# Patient Record
Sex: FEMALE | NOT HISPANIC OR LATINO | Employment: FULL TIME | URBAN - METROPOLITAN AREA
[De-identification: names, ages, dates, MRNs, and addresses within clinical notes are randomized per-mention and may not be internally consistent; named-entity substitution may affect disease eponyms.]

---

## 2024-08-20 ENCOUNTER — APPOINTMENT (OUTPATIENT)
Dept: ULTRASOUND IMAGING | Facility: HOSPITAL | Age: 49
End: 2024-08-20
Payer: MEDICAID

## 2024-08-20 ENCOUNTER — HOSPITAL ENCOUNTER (EMERGENCY)
Facility: HOSPITAL | Age: 49
Discharge: HOME | End: 2024-08-20
Attending: EMERGENCY MEDICINE
Payer: MEDICAID

## 2024-08-20 VITALS
HEART RATE: 97 BPM | BODY MASS INDEX: 26.46 KG/M2 | TEMPERATURE: 98.3 F | WEIGHT: 155 LBS | HEIGHT: 64 IN | OXYGEN SATURATION: 100 % | SYSTOLIC BLOOD PRESSURE: 129 MMHG | RESPIRATION RATE: 18 BRPM | DIASTOLIC BLOOD PRESSURE: 65 MMHG

## 2024-08-20 DIAGNOSIS — D72.829 LEUKOCYTOSIS, UNSPECIFIED TYPE: ICD-10-CM

## 2024-08-20 DIAGNOSIS — N93.9 VAGINAL BLEEDING: Primary | ICD-10-CM

## 2024-08-20 DIAGNOSIS — D25.9 UTERINE LEIOMYOMA, UNSPECIFIED LOCATION: ICD-10-CM

## 2024-08-20 DIAGNOSIS — R82.998 LEUKOCYTES IN URINE: ICD-10-CM

## 2024-08-20 LAB
ANION GAP SERPL CALCULATED.3IONS-SCNC: 7.1 MMOL/L (ref 5–15)
BACTERIA UR QL AUTO: ABNORMAL /HPF
BASOPHILS # BLD AUTO: 0.05 10*3/MM3 (ref 0–0.2)
BASOPHILS NFR BLD AUTO: 0.3 % (ref 0–1.5)
BILIRUB UR QL STRIP: NEGATIVE
BUN SERPL-MCNC: 22 MG/DL (ref 6–20)
BUN/CREAT SERPL: 20.4 (ref 7–25)
CALCIUM SPEC-SCNC: 8.8 MG/DL (ref 8.6–10.5)
CHLORIDE SERPL-SCNC: 106 MMOL/L (ref 98–107)
CLARITY UR: ABNORMAL
CO2 SERPL-SCNC: 23.9 MMOL/L (ref 22–29)
COLOR UR: ABNORMAL
CREAT SERPL-MCNC: 1.08 MG/DL (ref 0.57–1)
D-LACTATE SERPL-SCNC: 0.7 MMOL/L (ref 0.5–2)
DEPRECATED RDW RBC AUTO: 40.6 FL (ref 37–54)
EGFRCR SERPLBLD CKD-EPI 2021: 63.5 ML/MIN/1.73
EOSINOPHIL # BLD AUTO: 0.24 10*3/MM3 (ref 0–0.4)
EOSINOPHIL NFR BLD AUTO: 1.5 % (ref 0.3–6.2)
ERYTHROCYTE [DISTWIDTH] IN BLOOD BY AUTOMATED COUNT: 11.9 % (ref 12.3–15.4)
GLUCOSE SERPL-MCNC: 110 MG/DL (ref 65–99)
GLUCOSE UR STRIP-MCNC: NEGATIVE MG/DL
HCG SERPL QL: NEGATIVE
HCT VFR BLD AUTO: 33.2 % (ref 34–46.6)
HGB BLD-MCNC: 11.1 G/DL (ref 12–15.9)
HGB UR QL STRIP.AUTO: ABNORMAL
HOLD SPECIMEN: NORMAL
HYALINE CASTS UR QL AUTO: ABNORMAL /LPF
IMM GRANULOCYTES # BLD AUTO: 0.02 10*3/MM3 (ref 0–0.05)
IMM GRANULOCYTES NFR BLD AUTO: 0.1 % (ref 0–0.5)
KETONES UR QL STRIP: ABNORMAL
LEUKOCYTE ESTERASE UR QL STRIP.AUTO: ABNORMAL
LYMPHOCYTES # BLD AUTO: 1.72 10*3/MM3 (ref 0.7–3.1)
LYMPHOCYTES NFR BLD AUTO: 10.4 % (ref 19.6–45.3)
MCH RBC QN AUTO: 31.3 PG (ref 26.6–33)
MCHC RBC AUTO-ENTMCNC: 33.4 G/DL (ref 31.5–35.7)
MCV RBC AUTO: 93.5 FL (ref 79–97)
MONOCYTES # BLD AUTO: 0.66 10*3/MM3 (ref 0.1–0.9)
MONOCYTES NFR BLD AUTO: 4 % (ref 5–12)
NEUTROPHILS NFR BLD AUTO: 13.77 10*3/MM3 (ref 1.7–7)
NEUTROPHILS NFR BLD AUTO: 83.7 % (ref 42.7–76)
NITRITE UR QL STRIP: POSITIVE
PH UR STRIP.AUTO: 6 [PH] (ref 5–8)
PLATELET # BLD AUTO: 239 10*3/MM3 (ref 140–450)
PMV BLD AUTO: 9 FL (ref 6–12)
POTASSIUM SERPL-SCNC: 3.9 MMOL/L (ref 3.5–5.2)
PROT UR QL STRIP: ABNORMAL
RBC # BLD AUTO: 3.55 10*6/MM3 (ref 3.77–5.28)
RBC # UR STRIP: ABNORMAL /HPF
REF LAB TEST METHOD: ABNORMAL
SODIUM SERPL-SCNC: 137 MMOL/L (ref 136–145)
SP GR UR STRIP: 1.02 (ref 1–1.03)
SQUAMOUS #/AREA URNS HPF: ABNORMAL /HPF
UROBILINOGEN UR QL STRIP: ABNORMAL
WBC # UR STRIP: ABNORMAL /HPF
WBC NRBC COR # BLD AUTO: 16.46 10*3/MM3 (ref 3.4–10.8)

## 2024-08-20 PROCEDURE — 87086 URINE CULTURE/COLONY COUNT: CPT | Performed by: PHYSICIAN ASSISTANT

## 2024-08-20 PROCEDURE — 87801 DETECT AGNT MULT DNA AMPLI: CPT | Performed by: PHYSICIAN ASSISTANT

## 2024-08-20 PROCEDURE — 81001 URINALYSIS AUTO W/SCOPE: CPT | Performed by: PHYSICIAN ASSISTANT

## 2024-08-20 PROCEDURE — 87798 DETECT AGENT NOS DNA AMP: CPT | Performed by: PHYSICIAN ASSISTANT

## 2024-08-20 PROCEDURE — 87661 TRICHOMONAS VAGINALIS AMPLIF: CPT | Performed by: PHYSICIAN ASSISTANT

## 2024-08-20 PROCEDURE — 76830 TRANSVAGINAL US NON-OB: CPT

## 2024-08-20 PROCEDURE — 80048 BASIC METABOLIC PNL TOTAL CA: CPT | Performed by: EMERGENCY MEDICINE

## 2024-08-20 PROCEDURE — 83605 ASSAY OF LACTIC ACID: CPT | Performed by: PHYSICIAN ASSISTANT

## 2024-08-20 PROCEDURE — 25810000003 SODIUM CHLORIDE 0.9 % SOLUTION: Performed by: PHYSICIAN ASSISTANT

## 2024-08-20 PROCEDURE — 76856 US EXAM PELVIC COMPLETE: CPT

## 2024-08-20 PROCEDURE — 87591 N.GONORRHOEAE DNA AMP PROB: CPT | Performed by: PHYSICIAN ASSISTANT

## 2024-08-20 PROCEDURE — 99284 EMERGENCY DEPT VISIT MOD MDM: CPT

## 2024-08-20 PROCEDURE — 84703 CHORIONIC GONADOTROPIN ASSAY: CPT | Performed by: PHYSICIAN ASSISTANT

## 2024-08-20 PROCEDURE — 99284 EMERGENCY DEPT VISIT MOD MDM: CPT | Performed by: PHYSICIAN ASSISTANT

## 2024-08-20 PROCEDURE — 85025 COMPLETE CBC W/AUTO DIFF WBC: CPT | Performed by: EMERGENCY MEDICINE

## 2024-08-20 PROCEDURE — 87491 CHLMYD TRACH DNA AMP PROBE: CPT | Performed by: PHYSICIAN ASSISTANT

## 2024-08-20 RX ORDER — MORPHINE SULFATE 4 MG/ML
4 INJECTION, SOLUTION INTRAMUSCULAR; INTRAVENOUS ONCE
Status: DISCONTINUED | OUTPATIENT
Start: 2024-08-20 | End: 2024-08-20 | Stop reason: HOSPADM

## 2024-08-20 RX ORDER — MEDROXYPROGESTERONE ACETATE 10 MG
10 TABLET ORAL DAILY
Qty: 14 TABLET | Refills: 0 | Status: SHIPPED | OUTPATIENT
Start: 2024-08-20 | End: 2024-08-22 | Stop reason: SDUPTHER

## 2024-08-20 RX ORDER — CEPHALEXIN 500 MG/1
500 CAPSULE ORAL 3 TIMES DAILY
Qty: 21 CAPSULE | Refills: 0 | Status: SHIPPED | OUTPATIENT
Start: 2024-08-20 | End: 2024-08-27

## 2024-08-20 RX ORDER — NAPROXEN SODIUM 220 MG
220 TABLET ORAL 2 TIMES DAILY PRN
COMMUNITY

## 2024-08-20 RX ORDER — NAPROXEN 500 MG/1
500 TABLET ORAL 2 TIMES DAILY WITH MEALS
Qty: 10 TABLET | Refills: 0 | Status: SHIPPED | OUTPATIENT
Start: 2024-08-20 | End: 2024-08-25

## 2024-08-20 RX ORDER — IBUPROFEN 600 MG/1
600 TABLET, FILM COATED ORAL ONCE
Status: DISCONTINUED | OUTPATIENT
Start: 2024-08-20 | End: 2024-08-20 | Stop reason: HOSPADM

## 2024-08-20 RX ORDER — ONDANSETRON 2 MG/ML
4 INJECTION INTRAMUSCULAR; INTRAVENOUS ONCE
Status: DISCONTINUED | OUTPATIENT
Start: 2024-08-20 | End: 2024-08-20 | Stop reason: HOSPADM

## 2024-08-20 RX ORDER — SODIUM CHLORIDE 0.9 % (FLUSH) 0.9 %
10 SYRINGE (ML) INJECTION AS NEEDED
Status: DISCONTINUED | OUTPATIENT
Start: 2024-08-20 | End: 2024-08-20 | Stop reason: HOSPADM

## 2024-08-20 RX ADMIN — SODIUM CHLORIDE 1000 ML: 9 INJECTION, SOLUTION INTRAVENOUS at 11:56

## 2024-08-20 NOTE — DISCHARGE INSTRUCTIONS
I strongly advise you call that number provided to schedule a close follow-up regarding your vaginalvaginal bleeding.  Take the medications as prescribed.  The provera should help stop the bleeding. If you develop lightheadedness, or significantly worsening bleeding, return to the ER.    The urine shows that you may have a urinary tract infection and we will give you antibiotics.    It is very important that you follow-up with your primary care next week to recheck your symptoms and to recheck your lab work specifically your elevated WBC.

## 2024-08-20 NOTE — FSED PROVIDER NOTE
"Subjective   History of Present Illness    Patient, history of Jaden-Danlos syndrome, is complaining of intermittent vaginal bleeding for about 2 months.  She noticed large blood clots with the vaginal bleeding about 4 days ago with right lower quadrant abdominal pain.  Denies any nausea, vomiting, dysuria.  In addition, patient reports that she had a Mirena IUD placed several years ago and was supposed to get it removed about 4 years ago but never followed up with OB/GYN.  Patient recently moved here to [facility] and currently does not have an OB/GYN, she called [facility] and is unable to see them until 8 months.  Denies any lightheadedness.    She reports that she has to go through about 25 pads a day and the blood clots are \"candy bars\" size.    Review of Systems   Constitutional:  Negative for activity change and appetite change.   Eyes:  Negative for pain.   Respiratory:  Negative for shortness of breath.    Gastrointestinal:  Positive for abdominal pain. Negative for nausea and vomiting.   Genitourinary:  Positive for vaginal bleeding.   Musculoskeletal:  Negative for arthralgias.   Skin:  Negative for color change.   Neurological:  Negative for dizziness.   All other systems reviewed and are negative.      Past Medical History:   Diagnosis Date    Jaden-Danlos syndrome     IUD (intrauterine device) in place     pt reports she was supposed to have mirana taken out before covid.       Allergies   Allergen Reactions    Sulfa Antibiotics Hives       History reviewed. No pertinent surgical history.    History reviewed. No pertinent family history.    Social History     Socioeconomic History    Marital status: Unknown   Tobacco Use    Smoking status: Never   Substance and Sexual Activity    Drug use: Never    Sexual activity: Yes           Objective   Physical Exam  Vitals and nursing note reviewed.   Constitutional:       Appearance: Normal appearance. She is normal weight.   HENT:      Head: " Normocephalic and atraumatic.      Nose: Nose normal.      Mouth/Throat:      Mouth: Mucous membranes are moist.   Eyes:      Pupils: Pupils are equal, round, and reactive to light.   Cardiovascular:      Rate and Rhythm: Normal rate and regular rhythm.      Pulses: Normal pulses.      Heart sounds: Normal heart sounds.   Pulmonary:      Effort: Pulmonary effort is normal.      Breath sounds: Normal breath sounds.   Genitourinary:     Cervix: Cervical bleeding present.      Comments: No cervical motion tenderness  Musculoskeletal:         General: Normal range of motion.      Cervical back: Normal range of motion.      Right lower leg: No edema.      Left lower leg: No edema.   Skin:     General: Skin is warm.   Neurological:      General: No focal deficit present.      Mental Status: She is alert.   Psychiatric:         Behavior: Behavior is cooperative.         Procedures           ED Course  ED Course as of 08/20/24 1722 Tue Aug 20, 2024   1114 WBC(!): 16.46 [SS]   1115 Hemoglobin(!): 11.1 [SS]   1205 Did a pelvic exam with KAVON Sanderson as female chaperone.  No cervical motion tenderness, mod amount of bleeding seen in the vaginal vault.  Cervical os appears closed. [SS]   1300 I discussed with Madelyn ultrasound tech regarding the results.  She does note the IUD is in the internal cervical os, 7 to 8 cm uterine fibroid. [SS]   1335 I rechecked patient informed her of the lab results including the elevated WBC.  Informed her the pending official ultrasound report then plan to consult OB/GYN.  She expressed understanding and all questions addressed at this time. [SS]   1548 Discussed with Dr. CHA, OB/GYN on-call regarding patient.  He advised to prescribe patient Provera and gave number for patient to follow-up with. [SS]      ED Course User Index  [SS] Katarzyna Eason PA-C                                           Medical Decision Making  Problems Addressed:  Leukocytes in urine: complicated acute illness or  injury  Leukocytosis, unspecified type: complicated acute illness or injury  Uterine leiomyoma, unspecified location: complicated acute illness or injury  Vaginal bleeding: complicated acute illness or injury    Amount and/or Complexity of Data Reviewed  Labs: ordered. Decision-making details documented in ED Course.  Radiology: ordered.    Risk  Prescription drug management.        Final diagnoses:   Vaginal bleeding   Uterine leiomyoma, unspecified location   Leukocytes in urine   Leukocytosis, unspecified type       ED Disposition  ED Disposition       ED Disposition   Discharge    Condition   Stable    Comment   --               [phone]  Please call this number today for a close follow up regarding your obgyn             Medication List        New Prescriptions      cephalexin 500 MG capsule  Commonly known as: KEFLEX  Take 1 capsule by mouth 3 (Three) Times a Day for 7 days.     medroxyPROGESTERone 10 MG tablet  Commonly known as: Provera  Take 1 tablet by mouth Daily for 14 days.     naproxen 500 MG tablet  Commonly known as: NAPROSYN  Take 1 tablet by mouth 2 (Two) Times a Day With Meals for 5 days.               Where to Get Your Medications        These medications were sent to [facility] [id number] - [place], [place] - [address] AT [address] - [phone]  - [phone] fx [address] [place] [address]      Phone: [phone]   cephalexin 500 MG capsule  medroxyPROGESTERone 10 MG tablet  naproxen 500 MG tablet

## 2024-08-20 NOTE — Clinical Note
[facility]  [address] [id number]    Darlin Lantigua was seen and treated in our emergency department on 8/20/2024.  She may return to work on 08/22/2024.         Thank you for choosing [facility].    Katarzyna Eason PA-C

## 2024-08-21 LAB — BACTERIA SPEC AEROBE CULT: NO GROWTH

## 2024-08-22 ENCOUNTER — OFFICE VISIT (OUTPATIENT)
Dept: OBSTETRICS AND GYNECOLOGY | Age: 49
End: 2024-08-22
Payer: MEDICAID

## 2024-08-22 VITALS
SYSTOLIC BLOOD PRESSURE: 100 MMHG | BODY MASS INDEX: 25.1 KG/M2 | DIASTOLIC BLOOD PRESSURE: 62 MMHG | WEIGHT: 147 LBS | HEIGHT: 64 IN

## 2024-08-22 DIAGNOSIS — N93.9 ABNORMAL UTERINE BLEEDING (AUB): ICD-10-CM

## 2024-08-22 DIAGNOSIS — Z13.9 SPECIAL SCREENING: Primary | ICD-10-CM

## 2024-08-22 DIAGNOSIS — Z30.432 ENCOUNTER FOR IUD REMOVAL: ICD-10-CM

## 2024-08-22 DIAGNOSIS — Z12.31 SCREENING MAMMOGRAM FOR BREAST CANCER: ICD-10-CM

## 2024-08-22 LAB
A VAGINAE DNA VAG QL NAA+PROBE: NORMAL SCORE
B-HCG UR QL: NEGATIVE
BASOPHILS # BLD AUTO: 0.03 10*3/MM3 (ref 0–0.2)
BASOPHILS NFR BLD AUTO: 0.4 % (ref 0–1.5)
BVAB2 DNA VAG QL NAA+PROBE: NORMAL SCORE
C ALBICANS DNA VAG QL NAA+PROBE: NEGATIVE
C GLABRATA DNA VAG QL NAA+PROBE: NEGATIVE
C TRACH DNA SPEC QL NAA+PROBE: NEGATIVE
EOSINOPHIL # BLD AUTO: 0.17 10*3/MM3 (ref 0–0.4)
EOSINOPHIL NFR BLD AUTO: 2 % (ref 0.3–6.2)
ERYTHROCYTE [DISTWIDTH] IN BLOOD BY AUTOMATED COUNT: 11.2 % (ref 12.3–15.4)
EXPIRATION DATE: ABNORMAL
HCT VFR BLD AUTO: 28 % (ref 34–46.6)
HGB BLD-MCNC: 9.3 G/DL (ref 12–15.9)
IMM GRANULOCYTES # BLD AUTO: 0.02 10*3/MM3 (ref 0–0.05)
IMM GRANULOCYTES NFR BLD AUTO: 0.2 % (ref 0–0.5)
INTERNAL NEGATIVE CONTROL: NEGATIVE
INTERNAL POSITIVE CONTROL: NEGATIVE
LYMPHOCYTES # BLD AUTO: 1.74 10*3/MM3 (ref 0.7–3.1)
LYMPHOCYTES NFR BLD AUTO: 20.7 % (ref 19.6–45.3)
Lab: ABNORMAL
MCH RBC QN AUTO: 30.9 PG (ref 26.6–33)
MCHC RBC AUTO-ENTMCNC: 33.2 G/DL (ref 31.5–35.7)
MCV RBC AUTO: 93 FL (ref 79–97)
MEGA1 DNA VAG QL NAA+PROBE: NORMAL SCORE
MONOCYTES # BLD AUTO: 0.35 10*3/MM3 (ref 0.1–0.9)
MONOCYTES NFR BLD AUTO: 4.2 % (ref 5–12)
N GONORRHOEA DNA VAG QL NAA+PROBE: NEGATIVE
NEUTROPHILS # BLD AUTO: 6.11 10*3/MM3 (ref 1.7–7)
NEUTROPHILS NFR BLD AUTO: 72.5 % (ref 42.7–76)
NRBC BLD AUTO-RTO: 0 /100 WBC (ref 0–0.2)
PLATELET # BLD AUTO: 246 10*3/MM3 (ref 140–450)
RBC # BLD AUTO: 3.01 10*6/MM3 (ref 3.77–5.28)
T VAGINALIS DNA VAG QL NAA+PROBE: NEGATIVE
TSH SERPL DL<=0.005 MIU/L-ACNC: 2.14 UIU/ML (ref 0.27–4.2)
WBC # BLD AUTO: 8.42 10*3/MM3 (ref 3.4–10.8)

## 2024-08-22 RX ORDER — LEVONORGESTREL 52 MG/1
1 INTRAUTERINE DEVICE INTRAUTERINE
COMMUNITY
End: 2024-08-22

## 2024-08-22 RX ORDER — MEDROXYPROGESTERONE ACETATE 10 MG/1
20 TABLET ORAL 3 TIMES DAILY
Qty: 42 TABLET | Refills: 0 | Status: SHIPPED | OUTPATIENT
Start: 2024-08-22 | End: 2024-08-29

## 2024-08-22 NOTE — PROGRESS NOTES
"Jamaica Lantigua is a 49 y.o. female is being seen today for   Chief Complaint   Patient presents with    Gynecologic Exam     CC\" new patient here today with c/o heavy vaginal  bleeding for the straight 2 months with very large blood clots , pelvic pain  , non ob US 8/20/24 . Contraception IUD Mirena due for removal back in 2019 would like to remove it today,  Last pap 2018 , due for mammogram , hx of ovarian cyst , colonoscopy many years ago .    .    History of Present Illness    New patient  Here for follow up from ER due to abnormal uterine bleeding  Has not been to a gyn in years due to covid and then has moved multiple time and hasn't been able to find a provider  Has mirena that was placed in 2015 she would like removed today  Reports bleeding for two months straight  Ultrasound in ER showed IUD in place and fibroid (8.0 cm x  7.5 cm x 6.2 cm)  Dur for pap and mammogram as well  No history of any gyn problems  She is not sexually active    Usually has had monthly cycles that usually last 7 days  Never stopped having cycles with mirena  Started bleeding about two months ago, changing pads 3-4 times a day and never stopped  Last Friday it got heavier where she was soaking through pads in seconds      The following portions of the patient's history were reviewed and updated as appropriate: allergies, current medications, past family history, past medical history, past social history, past surgical history and problem list.    /62   Ht 162.6 cm (64\")   Wt 66.7 kg (147 lb)   LMP 08/20/2024 (Exact Date) Comment: bleeding x 2 months  BMI 25.23 kg/m²         Review of Systems   Constitutional: Negative.    HENT: Negative.     Eyes: Negative.    Respiratory: Negative.     Cardiovascular: Negative.    Gastrointestinal: Negative.    Endocrine: Negative.    Genitourinary:  Positive for menstrual problem and vaginal bleeding.   Musculoskeletal: Negative.    Skin: Negative.    Allergic/Immunologic: " Negative.    Neurological: Negative.    Hematological: Negative.    Psychiatric/Behavioral: Negative.         Objective   Physical Exam  IUD Removal    Date of procedure:  8/22/2024    Risks and benefits discussed? yes  All questions answered? yes  Consents given by The patient  Reason for removal: Device expiration        Procedure documentation:    A speculum was placed in order to view the cervix.  .  The IUD string was easily seen.  The string was grasped and the IUD was removed without difficulty.  The IUD did not appear to be adherent to the uterine cavity. It was removed intact.    She tolerated the procedure without any difficulty.           Endometrial Biopsy Procedure:     Informed consent was obtained and risks described as bleeding, cramping, uterine perforation,and infection. She desired to proceed. A speculum was inserted into the vagina and the cervix visualized and cleansed with betadine. An Allis clamp was used to grasp the anterior cervix. The pipelle was inserted and the plunger pulled back. The pipelle was rotated and withdrawn from the uterus with return of large amount of endometrial tissue. A total of 2 passes were made with the pipelle. The Allis clamp was then removed and hemostasis ensured. The patient tolerated the procedure well. Tissue was sent for pathologic examination.    Assessment & Plan   Diagnoses and all orders for this visit:    1. Special screening (Primary)  -     POC Pregnancy, Urine    2. Abnormal uterine bleeding (AUB)  -     CBC & Differential  -     TSH Rfx On Abnormal To Free T4  -     Reference Histopathology    3. Encounter for IUD removal    4. Screening mammogram for breast cancer  -     Mammo Screening Digital Tomosynthesis Bilateral With CAD    Other orders  -     medroxyPROGESTERone (Provera) 10 MG tablet; Take 2 tablets by mouth 3 times a day for 7 days.  Dispense: 42 tablet; Refill: 0        IUD removed today- will check benefits for a new one in case she  decides to replace  Endometrial biopsy sent  Provera 20mg TID x 7 days for heavy bleeding, Motrin 600mg TID for cramping and heavy bleeding  Bleeding precautions advised- she should return to the ER if saturating more an a pad/hr or with fever>101  CBC, TSH sent  Pap not obtained today due to bleeding  Follow up to discuss biopsy results and for AE with Dr King in 1-2 weeks to discuss options for treatment        Total time spent today with Darlin  was 30 minutes (level 3).  Greater than 50% of the time was spent coordinating care, answering her questions and counseling regarding pathophysiology of her presenting problem along with plans for any diagnositc work-up and treatment.

## 2024-08-27 ENCOUNTER — TELEPHONE (OUTPATIENT)
Dept: OBSTETRICS AND GYNECOLOGY | Age: 49
End: 2024-08-27
Payer: MEDICAID

## 2024-08-27 DIAGNOSIS — N93.9 ABNORMAL UTERINE BLEEDING (AUB): Primary | ICD-10-CM

## 2024-08-27 LAB
PATH REPORT.FINAL DX SPEC: NORMAL
PATH REPORT.GROSS SPEC: NORMAL
PATH REPORT.SITE OF ORIGIN SPEC: NORMAL
PATHOLOGIST NAME: NORMAL
PAYMENT PROCEDURE: NORMAL

## 2024-08-27 RX ORDER — MEDROXYPROGESTERONE ACETATE 10 MG
10 TABLET ORAL DAILY
Qty: 7 TABLET | Refills: 0 | Status: SHIPPED | OUTPATIENT
Start: 2024-08-27 | End: 2024-08-29 | Stop reason: SDUPTHER

## 2024-08-27 NOTE — TELEPHONE ENCOUNTER
Pt calling stating she was seen by Ayla in office as new pt on 8/22/24. Pt was prescribed a weeks worth of progesterone for heavy bleeding and was advised to make an appt to follow up with you. Pt did not have availability until 9/3/24. Pt asking if another Rx can be sent to pharmacy on file for progesterone to last her until appt? Please advise.

## 2024-08-29 ENCOUNTER — TELEPHONE (OUTPATIENT)
Dept: OBSTETRICS AND GYNECOLOGY | Age: 49
End: 2024-08-29
Payer: MEDICAID

## 2024-08-29 DIAGNOSIS — N93.9 ABNORMAL UTERINE BLEEDING (AUB): ICD-10-CM

## 2024-08-29 RX ORDER — MEDROXYPROGESTERONE ACETATE 10 MG
10 TABLET ORAL DAILY
Qty: 7 TABLET | Refills: 0 | Status: SHIPPED | OUTPATIENT
Start: 2024-08-29

## 2024-08-29 RX ORDER — MEDROXYPROGESTERONE ACETATE 10 MG
20 TABLET ORAL 3 TIMES DAILY
Qty: 42 TABLET | Refills: 0 | Status: SHIPPED | OUTPATIENT
Start: 2024-08-29 | End: 2024-09-05

## 2024-08-29 NOTE — TELEPHONE ENCOUNTER
Pt states that the wrong quantity of the prescription Proverea was sent to pharmacy on 8/27. Pt is requesting Provera 10MG, take 2 tablets three times a day for 7 days. The same prescription that was sent on 8/22 is what pt was requesting. Please advise

## 2024-08-29 NOTE — TELEPHONE ENCOUNTER
Patient calling very frustrated, Patient stating she cannot do one pill a day without having very heavy flow with severe cramping. Please advise

## 2024-08-29 NOTE — TELEPHONE ENCOUNTER
Wrong quantity was not sent. Specifically sent a lower dose, as the first prescription is only to be taken for a week and then the dosage needs to be decreased for maintenance daily use. Please call and inform patient.

## 2024-09-03 ENCOUNTER — OFFICE VISIT (OUTPATIENT)
Dept: OBSTETRICS AND GYNECOLOGY | Age: 49
End: 2024-09-03
Payer: MEDICAID

## 2024-09-03 VITALS
BODY MASS INDEX: 25.27 KG/M2 | HEIGHT: 64 IN | SYSTOLIC BLOOD PRESSURE: 112 MMHG | DIASTOLIC BLOOD PRESSURE: 76 MMHG | WEIGHT: 148 LBS

## 2024-09-03 DIAGNOSIS — N93.9 ABNORMAL UTERINE BLEEDING (AUB): Primary | ICD-10-CM

## 2024-09-03 DIAGNOSIS — D62 ANEMIA DUE TO ACUTE BLOOD LOSS: ICD-10-CM

## 2024-09-03 PROBLEM — Z30.432 ENCOUNTER FOR IUD REMOVAL: Status: RESOLVED | Noted: 2024-08-22 | Resolved: 2024-09-03

## 2024-09-03 PROCEDURE — 1159F MED LIST DOCD IN RCRD: CPT | Performed by: STUDENT IN AN ORGANIZED HEALTH CARE EDUCATION/TRAINING PROGRAM

## 2024-09-03 PROCEDURE — 1160F RVW MEDS BY RX/DR IN RCRD: CPT | Performed by: STUDENT IN AN ORGANIZED HEALTH CARE EDUCATION/TRAINING PROGRAM

## 2024-09-03 PROCEDURE — 99214 OFFICE O/P EST MOD 30 MIN: CPT | Performed by: STUDENT IN AN ORGANIZED HEALTH CARE EDUCATION/TRAINING PROGRAM

## 2024-09-03 RX ORDER — MEDROXYPROGESTERONE ACETATE 10 MG
10 TABLET ORAL DAILY
Qty: 60 TABLET | Refills: 0 | Status: SHIPPED | OUTPATIENT
Start: 2024-09-03

## 2024-09-03 NOTE — ASSESSMENT & PLAN NOTE
Long discussion with patient regarding finding of fundal fibroid and management options of her abnormal uterine bleeding.  We also reviewed that her endometrial biopsy completed at her last appointment was not sufficient likely due to her heavy bleeding.  Reviewed medical options with patient including continued oral progesterone therapy or replacement of an IUD.  Also discussed surgical management via hysterectomy.  As patient is currently feeling overall well with oral progesterone therapy, she would like to continue this at this time.  Will continue maintenance dose of 10 mg daily and patient in agreement with plan.  Given recent completion of high-dose progesterone, will have patient follow-up in approximately 2 months for repeat endometrial biopsy and Pap smear at that time.  Will also assess patient's symptoms and consider tapering of progesterone therapy.  All questions and concerns addressed.  Counseled that oral progesterone is not contraception, and should sexual activity occur, pregnancy is possible and condoms should be used for prevention

## 2024-09-03 NOTE — ASSESSMENT & PLAN NOTE
Anemia noted on patient's most recent labs with hemoglobin of 9.3.  Likely attributable to acute bleeding episode for which she sought care at an urgent care prior to establishing with our practice.  Anticipate this will improve with time, but did discuss with patient that anemia could recur if oral progesterone therapy is not sufficient for treatment of her symptoms.

## 2024-09-03 NOTE — PROGRESS NOTES
"[facility]   Obstetrics and Gynecology   Return Gynecology Visit    9/3/2024    Patient: Darlin Lantigua          MR#:[id number]    History of Present Illness    Chief Complaint   Patient presents with    Gynecologic Exam     F/u for abnormal bleeding, Pt currently not bleeding, pt has no complaints today       49 y.o. female  who presents for follow-up of abnormal uterine bleeding.    Patient's bleeding stopped with use of high-dose Provera.  She has not had any bleeding in 6 days.  Does not have any dysmenorrhea today.  When she does have dysmenorrhea, naproxen improves this pain.  She reports that her IUD had been in place for approximately 9 years and was due out in .  This was deferred due to the pandemic and multiple moves.  In  her periods started becoming heavier, and then had an acute episode of bleeding last month.  She bled through 24 maxi pads in a day, with some of those pads feeling in seconds and overflowing onto herself.  She has also noted the passage of \"candy bar\" sized blood clots over the past 2 months.  She is not currently sexually active as she is not in a relationship.    Obstetric History:  OB History          1    Para   1    Term   1            AB        Living   1         SAB        IAB        Ectopic        Molar        Multiple        Live Births   1               Menstrual History:     Patient's last menstrual period was 2024 (exact date).     ________________________________________  Patient Active Problem List   Diagnosis    Abnormal uterine bleeding (AUB)    Encounter for IUD removal    Special screening     Past Medical History:   Diagnosis Date    Jaden-Danlos syndrome      Past Surgical History:   Procedure Laterality Date    HEMORRHOIDECTOMY      HERNIA REPAIR       Social History     Tobacco Use   Smoking Status Never   Smokeless Tobacco Not on file     Family History   Problem Relation Age of Onset    Breast cancer Neg Hx     " "Ovarian cancer Neg Hx     Uterine cancer Neg Hx     Colon cancer Neg Hx      Prior to Admission medications    Medication Sig Start Date End Date Taking? Authorizing Provider   Multiple Vitamin (MULTIVITAMINS PO) Take  by mouth.   Yes Pedro Davidson MD   naproxen sodium (ALEVE) 220 MG tablet Take 1 tablet by mouth 2 (Two) Times a Day As Needed for Mild Pain.   Yes ProviderPedro MD   Levonorgestrel (Mirena, 52 MG,) 20 MCG/DAY intrauterine device IUD To be inserted one time by prescriber. Route intrauterine. 8/26/24   Radha San MD   medroxyPROGESTERone (Provera) 10 MG tablet Take 1 tablet by mouth Daily.  Patient not taking: Reported on 9/3/2024 8/29/24   Socorro Valderrama MD   medroxyPROGESTERone (Provera) 10 MG tablet Take 2 tablets by mouth 3 times a day for 7 days.  Patient not taking: Reported on 9/3/2024 8/29/24 9/5/24  Socorro Valderrama MD     ________________________________________    Review of Systems   Constitutional:  Negative for chills and fever.   Gastrointestinal:  Negative for abdominal pain, constipation, diarrhea, nausea and vomiting.   Genitourinary:  Negative for dysuria, frequency and urgency.            Objective     /76   Ht 162.6 cm (64.02\")   Wt 67.1 kg (148 lb)   LMP 08/20/2024 (Exact Date) Comment: bleeding x 2 months  BMI 25.39 kg/m²    BP Readings from Last 3 Encounters:   09/03/24 112/76   08/22/24 100/62   08/20/24 129/65      Wt Readings from Last 3 Encounters:   09/03/24 67.1 kg (148 lb)   08/22/24 66.7 kg (147 lb)   08/20/24 70.3 kg (155 lb)        BMI: Estimated body mass index is 25.39 kg/m² as calculated from the following:    Height as of this encounter: 162.6 cm (64.02\").    Weight as of this encounter: 67.1 kg (148 lb).    Physical Exam  Vitals and nursing note reviewed.   Constitutional:       General: She is not in acute distress.     Appearance: Normal appearance.   HENT:      Head: Normocephalic and atraumatic.   Eyes:      Extraocular " Movements: Extraocular movements intact.   Cardiovascular:      Rate and Rhythm: Normal rate and regular rhythm.   Pulmonary:      Effort: Pulmonary effort is normal. No respiratory distress.   Abdominal:      General: There is no distension.      Palpations: Abdomen is soft. There is no mass.      Tenderness: There is no abdominal tenderness.   Musculoskeletal:         General: No swelling. Normal range of motion.      Cervical back: Normal range of motion.   Skin:     General: Skin is warm and dry.   Neurological:      General: No focal deficit present.      Mental Status: She is alert and oriented to person, place, and time.   Psychiatric:         Mood and Affect: Mood normal.         Behavior: Behavior normal.       Assessment:  Darlin Lantigua is a 49 y.o.  presenting for follow-up of abnormal uterine bleeding.    Diagnoses and all orders for this visit:    1. Abnormal uterine bleeding (AUB) (Primary)  Assessment & Plan:  Long discussion with patient regarding finding of fundal fibroid and management options of her abnormal uterine bleeding.  We also reviewed that her endometrial biopsy completed at her last appointment was not sufficient likely due to her heavy bleeding.  Reviewed medical options with patient including continued oral progesterone therapy or replacement of an IUD.  Also discussed surgical management via hysterectomy.  As patient is currently feeling overall well with oral progesterone therapy, she would like to continue this at this time.  Will continue maintenance dose of 10 mg daily and patient in agreement with plan.  Given recent completion of high-dose progesterone, will have patient follow-up in approximately 2 months for repeat endometrial biopsy and Pap smear at that time.  Will also assess patient's symptoms and consider tapering of progesterone therapy.  All questions and concerns addressed.  Counseled that oral progesterone is not contraception, and should sexual activity  occur, pregnancy is possible and condoms should be used for prevention    Orders:  -     medroxyPROGESTERone (Provera) 10 MG tablet; Take 1 tablet by mouth Daily.  Dispense: 60 tablet; Refill: 0    2. Anemia due to acute blood loss  Assessment & Plan:  Anemia noted on patient's most recent labs with hemoglobin of 9.3.  Likely attributable to acute bleeding episode for which she sought care at an urgent care prior to establishing with our practice.  Anticipate this will improve with time, but did discuss with patient that anemia could recur if oral progesterone therapy is not sufficient for treatment of her symptoms.        Plan:  Return in about 2 months (around 11/3/2024) for f/u AUB, EMB, Pap.      Socorro Valderrama MD  9/3/2024 14:17 EDT

## 2024-09-05 ENCOUNTER — TELEPHONE (OUTPATIENT)
Dept: OBSTETRICS AND GYNECOLOGY | Age: 49
End: 2024-09-05
Payer: MEDICAID

## 2024-09-05 NOTE — TELEPHONE ENCOUNTER
Pt states she has started bleeding again and wanted you to be aware,pt not saturating 1 pad per hour

## 2024-09-05 NOTE — TELEPHONE ENCOUNTER
Thank you - noted. Please review bleeding precautions with her that if saturating a pad per hour for two hours straight or any symptoms of severe anemia (lightheadedness, shortness of breath) she should seek immediate care.    Socorro Valderrama MD  9/5/2024 13:49 EDT

## 2024-09-07 ENCOUNTER — TELEPHONE (OUTPATIENT)
Dept: OBSTETRICS AND GYNECOLOGY | Age: 49
End: 2024-09-07
Payer: MEDICAID

## 2024-09-07 NOTE — TELEPHONE ENCOUNTER
Patient called this weekend with complaints that she was told to go to the ER if she had severe bleeding.  She said she is very frustrated and would like to be seen.  She was taking from what she says up to 60 mg of Provera daily and is now on 10.  She is having heavier bleeding today.  Discussed that she could go to the ER but she declines that.  I will have the patient take 20 mg today and tomorrow and please call the patient on Monday to bring her in to be seen.  She might be a good candidate for [place] to temporize things.  She does have a large 11 cm uterus and a large 8 cm fibroid.  She is considering surgical options.

## 2024-09-09 NOTE — TELEPHONE ENCOUNTER
09/09/24  Darlin Grzegorz  1975  [id number]     Please call patient and schedule her with me on 9/10 or 9/11. Thank you!    Socorro Valderrama MD  9/9/2024   09:11 EDT

## 2024-09-10 ENCOUNTER — OFFICE VISIT (OUTPATIENT)
Dept: OBSTETRICS AND GYNECOLOGY | Age: 49
End: 2024-09-10
Payer: MEDICAID

## 2024-09-10 VITALS
WEIGHT: 151.8 LBS | BODY MASS INDEX: 25.92 KG/M2 | HEIGHT: 64 IN | DIASTOLIC BLOOD PRESSURE: 80 MMHG | SYSTOLIC BLOOD PRESSURE: 104 MMHG

## 2024-09-10 DIAGNOSIS — D25.9 UTERINE LEIOMYOMA, UNSPECIFIED LOCATION: ICD-10-CM

## 2024-09-10 DIAGNOSIS — N93.9 ABNORMAL UTERINE BLEEDING (AUB): Primary | ICD-10-CM

## 2024-09-10 PROCEDURE — 99213 OFFICE O/P EST LOW 20 MIN: CPT | Performed by: STUDENT IN AN ORGANIZED HEALTH CARE EDUCATION/TRAINING PROGRAM

## 2024-09-10 RX ORDER — RELUGOLIX, ESTRADIOL HEMIHYDRATE, AND NORETHINDRONE ACETATE 40; 1; .5 MG/1; MG/1; MG/1
1 TABLET, FILM COATED ORAL DAILY
Qty: 90 TABLET | Refills: 0 | Status: SHIPPED | OUTPATIENT
Start: 2024-09-10

## 2024-09-10 NOTE — PROGRESS NOTES
"[facility]   Obstetrics and Gynecology   Return Gynecology Visit    9/10/2024    Patient: Darlin Lantigua          MR#:[id number]    History of Present Illness    Chief Complaint   Patient presents with    Gynecologic Exam     F/u for abnormal uterine bleeding, cramps and back pains. Pt approved for Mirena, would like to discuss option for placement.       49 y.o. female  who presents for follow-up of AUB.    When patient was last evaluated, she had just completed a course of high-dose Provera and was tapered down to 10 mg daily.  She reports the day after that appointment, she started spotting, and then every day experienced heavier and heavier bleeding with passage of clots approximately 4 inches in size.  She called the on call physician over the weekend who instructed her to increase her dose of Provera again and to be seen urgently.    Patient has been taking 50 mg of Provera daily for the past 2 days.  This has been spaced as 2 pills in the morning, 1 pill in the afternoon, 2 pills in the evening.  Her bleeding is slowing, and today is a \"normal\" amount that is manageable.  She is understandably frustrated by this bleeding and also by that she feels like her medication difficulties among office staff.    Patient is stressed because she feels like she needs surgery ultimately, but works part-time and is a .  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  Is concerned about the impact a major surgery would have on her ability to work as well as continue school.    Obstetric History:  OB History          1    Para   1    Term   1            AB        Living   1         SAB        IAB        Ectopic        Molar        Multiple        Live Births   1               Menstrual History:     Patient's last menstrual period was 2024 (exact date).     ________________________________________  Patient Active Problem List   Diagnosis    " "Abnormal uterine bleeding (AUB)    Special screening    Anemia due to acute blood loss     Past Medical History:   Diagnosis Date    Jaden-Danlos syndrome      Past Surgical History:   Procedure Laterality Date    HEMORRHOIDECTOMY      HERNIA REPAIR       Social History     Tobacco Use   Smoking Status Never   Smokeless Tobacco Not on file     Family History   Problem Relation Age of Onset    No Known Problems Father     No Known Problems Mother     Stroke Paternal Grandfather     Breast cancer Neg Hx     Ovarian cancer Neg Hx     Uterine cancer Neg Hx     Colon cancer Neg Hx     Prostate cancer Neg Hx     Pancreatic cancer Neg Hx      Prior to Admission medications    Medication Sig Start Date End Date Taking? Authorizing Provider   medroxyPROGESTERone (Provera) 10 MG tablet Take 1 tablet by mouth Daily. 9/3/24   Socorro Valderrama MD   Multiple Vitamin (MULTIVITAMINS PO) Take  by mouth.    ProviderPedro MD   naproxen sodium (ALEVE) 220 MG tablet Take 1 tablet by mouth 2 (Two) Times a Day As Needed for Mild Pain.    ProviderPedro MD     ________________________________________    Review of Systems   Constitutional:  Negative for chills and fever.   Gastrointestinal:  Negative for abdominal pain, constipation, diarrhea, nausea and vomiting.   Genitourinary:  Positive for menstrual problem. Negative for dysuria, frequency and urgency.            Objective     /80   Ht 162.6 cm (64.02\")   Wt 68.9 kg (151 lb 12.8 oz)   LMP 08/20/2024 (Exact Date) Comment: bleeding x 2 months  BMI 26.04 kg/m²    BP Readings from Last 3 Encounters:   09/10/24 104/80   09/03/24 112/76   08/22/24 100/62      Wt Readings from Last 3 Encounters:   09/10/24 68.9 kg (151 lb 12.8 oz)   09/03/24 67.1 kg (148 lb)   08/22/24 66.7 kg (147 lb)        BMI: Estimated body mass index is 26.04 kg/m² as calculated from the following:    Height as of this encounter: 162.6 cm (64.02\").    Weight as of this encounter: 68.9 kg (151 lb " 12.8 oz).    Physical Exam  Vitals and nursing note reviewed.   Constitutional:       General: She is not in acute distress.     Appearance: Normal appearance.   HENT:      Head: Normocephalic and atraumatic.   Eyes:      Extraocular Movements: Extraocular movements intact.   Cardiovascular:      Rate and Rhythm: Normal rate and regular rhythm.   Pulmonary:      Effort: Pulmonary effort is normal. No respiratory distress.   Abdominal:      General: There is no distension.      Palpations: Abdomen is soft. There is no mass.      Tenderness: There is no abdominal tenderness.   Musculoskeletal:         General: No swelling. Normal range of motion.      Cervical back: Normal range of motion.   Skin:     General: Skin is warm and dry.   Neurological:      General: No focal deficit present.      Mental Status: She is alert and oriented to person, place, and time.   Psychiatric:         Mood and Affect: Mood normal.         Behavior: Behavior normal.       Assessment:  Darlin Lantigua is a 49 y.o.  presenting for abnormal uterine bleeding.    Diagnoses and all orders for this visit:    1. Abnormal uterine bleeding (AUB) (Primary)  Overview:  Patient scheduled for urgent appointment after return of heavy bleeding as soon as Provera dose was tapered to 10 mg daily.  Patient counseled that IUD could potential lead work as an option, but may not be able to be placed due to fundal fibroid.  Patient also hesitant as she had difficulty getting an appointment for removal previously and does not like the lack of control over that method.  Discussed we cannot continue this high of a dose of Provera indefinitely.  We discussed option of Myfembree as a bridge to definitive surgical management.  In discussion of Myfembree, we reviewed that this is a GnRH antagonist with add back estrogen and progesterone.  Reviewed this can only be used for 2 years maximum due to risk of bone loss.  Patient does not have any contraindications to  this therapy.  Reviewed anticipated side effects.  As patient has Medicaid insurance, 30-day sample supply was provided and prior authorization will be completed.  Patient is nervous about side effects but is hopeful that this will provide her some short-term relief.  Regarding definitive surgical management, patient with understandable concerns as she is a single mom with a son.  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  States her employer does not have options for light or desk duty.  She thinks that she could maybe find another job and prepare for surgery at the beginning of next year with use of Myfembree as a bridge to that time.  Will see patient in 1 week for Pap and endometrial biopsy when bleeding has hopefully stopped.    Orders:  -     Relugolix-Estradiol-Norethind (Myfembree) 40-1-0.5 MG tablet; Take 1 tablet by mouth Daily.  Dispense: 90 tablet; Refill: 0    2. Uterine leiomyoma, unspecified location      Plan:  Return in about 1 week (around 9/17/2024) for Pap, EMB.    Socorro Valderrama MD  9/10/2024 11:06 EDT

## 2024-09-17 ENCOUNTER — OFFICE VISIT (OUTPATIENT)
Dept: OBSTETRICS AND GYNECOLOGY | Age: 49
End: 2024-09-17
Payer: MEDICAID

## 2024-09-17 VITALS
WEIGHT: 148.4 LBS | BODY MASS INDEX: 25.33 KG/M2 | SYSTOLIC BLOOD PRESSURE: 104 MMHG | HEIGHT: 64 IN | DIASTOLIC BLOOD PRESSURE: 78 MMHG

## 2024-09-17 DIAGNOSIS — Z01.419 ENCOUNTER FOR GYNECOLOGICAL EXAMINATION WITHOUT ABNORMAL FINDING: ICD-10-CM

## 2024-09-17 DIAGNOSIS — N93.9 ABNORMAL UTERINE BLEEDING (AUB): Primary | ICD-10-CM

## 2024-09-17 PROCEDURE — 58100 BIOPSY OF UTERUS LINING: CPT | Performed by: STUDENT IN AN ORGANIZED HEALTH CARE EDUCATION/TRAINING PROGRAM

## 2024-09-17 PROCEDURE — 99213 OFFICE O/P EST LOW 20 MIN: CPT | Performed by: STUDENT IN AN ORGANIZED HEALTH CARE EDUCATION/TRAINING PROGRAM

## 2024-09-20 LAB
CYTOLOGIST CVX/VAG CYTO: NORMAL
CYTOLOGY CVX/VAG DOC CYTO: NORMAL
CYTOLOGY CVX/VAG DOC THIN PREP: NORMAL
DX ICD CODE: NORMAL
HPV I/H RISK 4 DNA CVX QL PROBE+SIG AMP: NEGATIVE
Lab: NORMAL
OTHER STN SPEC: NORMAL
PATH REPORT.FINAL DX SPEC: NORMAL
PATH REPORT.GROSS SPEC: NORMAL
PATH REPORT.SITE OF ORIGIN SPEC: NORMAL
PATHOLOGIST NAME: NORMAL
PAYMENT PROCEDURE: NORMAL
STAT OF ADQ CVX/VAG CYTO-IMP: NORMAL

## 2024-09-30 ENCOUNTER — HOSPITAL ENCOUNTER (OUTPATIENT)
Facility: HOSPITAL | Age: 49
Discharge: HOME | End: 2024-09-30
Attending: EMERGENCY MEDICINE | Admitting: EMERGENCY MEDICINE
Payer: MEDICAID

## 2024-09-30 VITALS
HEART RATE: 84 BPM | HEIGHT: 64 IN | SYSTOLIC BLOOD PRESSURE: 107 MMHG | DIASTOLIC BLOOD PRESSURE: 69 MMHG | RESPIRATION RATE: 16 BRPM | WEIGHT: 148 LBS | BODY MASS INDEX: 25.27 KG/M2 | OXYGEN SATURATION: 99 % | TEMPERATURE: 98 F

## 2024-09-30 DIAGNOSIS — D21.9 FIBROID: ICD-10-CM

## 2024-09-30 DIAGNOSIS — N93.9 VAGINAL BLEEDING: Primary | ICD-10-CM

## 2024-09-30 PROCEDURE — G0463 HOSPITAL OUTPT CLINIC VISIT: HCPCS | Performed by: EMERGENCY MEDICINE

## 2024-09-30 RX ORDER — MEDROXYPROGESTERONE ACETATE 10 MG
TABLET ORAL
Qty: 90 TABLET | Refills: 0 | Status: SHIPPED | OUTPATIENT
Start: 2024-09-30

## 2024-09-30 RX ORDER — MEDROXYPROGESTERONE ACETATE 10 MG
TABLET ORAL
Qty: 90 TABLET | Refills: 0 | Status: SHIPPED | OUTPATIENT
Start: 2024-09-30 | End: 2024-09-30

## 2024-09-30 NOTE — DISCHARGE INSTRUCTIONS
Today I was unable to verify any information on combining Provera with Myfembree.  At this time my advice for the emergency department would be to utilize the Provera by itself.  I did send in a 90 tablet prescription to [facility].  I printed out a separate written prescription with an attached GoodRx coupon for [facility].  The price utilizing this coupon at [facility] is $17.31.    Please touch base with your OB/GYN this week.  I know you are an difficult situation.    We will try to stabilize things in the short-term out of the emergency department    Please read all of the instructions in this handout.  If you receive prescriptions please fill them and take them as directed.  Please call your primary care physician for follow-up appointment in the next 5 to 7 days.  If you do not have a physician you may call the [facility] referral line at [phone].    You may return to the emergency department at any time for any concerns such as worsening symptoms.  If you received a work or school note it will be printed at the back of this packet.

## 2024-09-30 NOTE — FSED PROVIDER NOTE
EMERGENCY DEPARTMENT ENCOUNTER    Room Number:  03/03  Date seen:  9/30/2024  Time seen: 16:47 EDT  PCP: Provider, No Known  Historian:     Discussed/obtained information from independent historians:     HPI:  The patient is a 49-year-old female.  She has a history of abnormal uterine bleeding for the last 2 to 3 months.  She has been seen by OB/GYN and has been diagnosed with a uterine fibroid.  She also has undergone a uterine biopsy.    She was managed with very high doses of Provera secondary to her somewhat stubborn uterine bleeding.  At her last visit on 9/17/2024 she was changed over to Myfembree and attempt to better manage the vaginal bleeding.  Patient is unable to undergo hysterectomy at this time secondary to her current state of employment.  She is likewise a single mom.    She presents today with ongoing vaginal bleeding.  She states it has become heavy once more.  No fever no chills      External (non-ED) record review:        Chronic or social conditions impacting care:    ALLERGIES  Sulfa antibiotics    PAST MEDICAL HISTORY  Active Ambulatory Problems     Diagnosis Date Noted    Abnormal uterine bleeding (AUB) 08/22/2024    Special screening 08/22/2024    Anemia due to acute blood loss 09/03/2024    Fibroid uterus 09/10/2024     Resolved Ambulatory Problems     Diagnosis Date Noted    Encounter for IUD removal 08/22/2024     Past Medical History:   Diagnosis Date    Jaden-Danlos syndrome        PAST SURGICAL HISTORY  Past Surgical History:   Procedure Laterality Date    HEMORRHOIDECTOMY      HERNIA REPAIR         FAMILY HISTORY  Family History   Problem Relation Age of Onset    No Known Problems Father     No Known Problems Mother     Stroke Paternal Grandfather     Breast cancer Neg Hx     Ovarian cancer Neg Hx     Uterine cancer Neg Hx     Colon cancer Neg Hx     Prostate cancer Neg Hx     Pancreatic cancer Neg Hx        SOCIAL HISTORY  Social History     Socioeconomic History    Marital  status: Unknown   Tobacco Use    Smoking status: Never   Vaping Use    Vaping status: Never Used   Substance and Sexual Activity    Alcohol use: Yes    Drug use: Never    Sexual activity: Not Currently     Birth control/protection: None       REVIEW OF SYSTEMS  Review of Systems    All systems reviewed and negative except for those discussed in HPI.       PHYSICAL EXAM    I have reviewed the triage vital signs and nursing notes.  Vitals:    09/30/24 1548   BP: 132/77   Pulse: 90   Resp: 16   Temp: 98 °F (36.7 °C)   SpO2: 100%     Physical Exam  Vital signs: Reviewed in nurses notes    General: Patient is awake alert no acute distress    HEENT: Normocephalic atraumatic    Neck:   Supple without lymphadenopathy    Respiratory:   Nonlabored respirations.  Clear to auscultation bilaterally.  Equal breath sounds bilaterally.  No wheezes or stridor noted.    Cardiovascular: Regular rate and rhythm.  No murmur.  Equal pulses in bilateral lower extremities without edema.    Abdomen: Very soft nondistended.  Diffuse bilateral lower quadrant tenderness to deep palpation without guarding or rebound    Skin:   Warm and dry.  No rashes noted    Neurological examination: Patient is awake alert oriented x4.  Speech is normal.  No facial palsy.  No focal motor or sensory deficits.    LAB RESULTS  No results found for this or any previous visit (from the past 24 hour(s)).    Ordered the above labs and independently interpreted results.  My findings will be discussed in the ED course or medical decision making section below      PROCEDURES:  Procedures      RADIOLOGY RESULTS  No Radiology Exams Resulted Within Past 24 Hours     Ordered the above noted radiological studies.  Independently interpreted by me.  My findings will be discussed in the medical decision section below.     PROGRESS, DATA ANALYSIS, CONSULTS AND MEDICAL DECISION MAKING    Please note that this section constitutes my independent interpretation of clinical data  including lab results, radiology, EKG's.  This constitutes my independent professional opinion regarding differential diagnosis and management of this patient.  It may include any factors such as history from outside sources, review of external records, social determinants of health, management of medications, response to those treatments, and discussions with other providers.    ED Course as of 09/30/24 1654   Mon Sep 30, 2024   1653 At this time the patient is certainly in a difficult position.  She cannot be maintained on these very high doses of Provera indefinitely.  She also states she is unable to undergo surgery at this time.    Plan: From the emergency department we do not have too many options.  I am going to place her back on Provera and have asked her to hold her Myfembree until she speaks with her OB/GYN. [TC]      ED Course User Index  [TC] Sky Jung MD     Orders placed during this visit:  No orders of the defined types were placed in this encounter.      Medications - No data to display         Medical Decision Making  Problems Addressed:  Fibroid: complicated acute illness or injury  Vaginal bleeding: complicated acute illness or injury    Risk  Prescription drug management.            DIAGNOSIS  Final diagnoses:   Vaginal bleeding   Fibroid          Medication List        New Prescriptions      medroxyPROGESTERone 10 MG tablet  Commonly known as: Provera  5 po daily x 3d, then 2 po bid thereafter               Where to Get Your Medications        You can get these medications from any pharmacy    Bring a paper prescription for each of these medications  medroxyPROGESTERone 10 MG tablet         FOLLOW-UP  ob/gyn      As soon as possible        Latest Documented Vital Signs:  As of 16:54 EDT  BP- 132/77 HR- 90 Temp- 98 °F (36.7 °C) O2 sat- 100%    Appropriate PPE utilized throughout this patient encounter to include mask, if indicated, per current protocol. Hand hygiene was performed before  donning PPE and after removal when leaving the room.    Please note that portions of this were completed with a voice recognition program.     Note Disclaimer: At [facility], we believe that sharing information builds trust and better relationships. You are receiving this note because you are receiving care at [facility] or recently visited. It is possible you will see health information before a provider has talked with you about it. This kind of information can be easy to misunderstand. To help you fully understand what it means for your health, we urge you to discuss this note with your provider.

## 2024-10-02 ENCOUNTER — OFFICE VISIT (OUTPATIENT)
Dept: OBSTETRICS AND GYNECOLOGY | Age: 49
End: 2024-10-02
Payer: MEDICAID

## 2024-10-02 VITALS
DIASTOLIC BLOOD PRESSURE: 60 MMHG | SYSTOLIC BLOOD PRESSURE: 120 MMHG | BODY MASS INDEX: 25.33 KG/M2 | WEIGHT: 148.4 LBS | HEIGHT: 64 IN

## 2024-10-02 DIAGNOSIS — N93.9 ABNORMAL UTERINE BLEEDING (AUB): Primary | ICD-10-CM

## 2024-10-02 DIAGNOSIS — D62 ANEMIA DUE TO ACUTE BLOOD LOSS: ICD-10-CM

## 2024-10-02 RX ORDER — MEDROXYPROGESTERONE ACETATE 150 MG/ML
150 INJECTION, SUSPENSION INTRAMUSCULAR
Qty: 1 ML | Refills: 3 | Status: SHIPPED | OUTPATIENT
Start: 2024-10-02

## 2024-10-02 RX ORDER — MEDROXYPROGESTERONE ACETATE 10 MG
10 TABLET ORAL 3 TIMES DAILY
Qty: 90 TABLET | Refills: 3 | Status: SHIPPED | OUTPATIENT
Start: 2024-10-02

## 2024-10-02 NOTE — ASSESSMENT & PLAN NOTE
Will reassess patient's current hemoglobin with CBC.  She will have this collected when she returns 10/3/2024 for her Depo injection.

## 2024-10-02 NOTE — PROGRESS NOTES
"[facility]   Obstetrics and Gynecology   Return Gynecology Visit    10/2/2024    Patient: Darlin Lantigua          MR#:[id number]    History of Present Illness    Chief Complaint   Patient presents with    Gynecologic Exam     ER f/u for abnormal uterine bleeding(AUB)     49 y.o. female  who presents for follow-up of abnormal uterine bleeding due to uterine fibroid.    Patient has been seen several times for this issue.  Patient states that she transitioned from Provera to Myfembree exactly 1 week ago today.  With this transition, she again experienced the slow onset of bleeding which reached its peak  and .  She reports that she experienced debilitating pain like she has never experienced before.  She also had extremely heavy bleeding with passage of at least 4 \"avocado size\" clots in addition to several other clots and a large amount of bright red bleeding.  This large amount of bleeding required her to take a 2-hour long shower and then use rubber gloves to clean the clots out of her shower drain.  She did go to the ER on  where the ER provider prescribed her additional Provera to be taken at 20 mg 3 times daily for 3 days and then transitioned to 10 mg twice daily thereafter.  Patient is understandably frustrated and scared given that only high-dose Provera seems to control her bleeding at this time.  Still is not in a place to seriously consider surgical intervention sooner than mid December.    Obstetric History:  OB History          1    Para   1    Term   1            AB        Living   1         SAB        IAB        Ectopic        Molar        Multiple        Live Births   1               Menstrual History:     Currently bleeding  ________________________________________  Patient Active Problem List   Diagnosis    Abnormal uterine bleeding (AUB)    Special screening    Anemia due to acute blood loss    Fibroid uterus     Past Medical History:   Diagnosis Date " "   Jaden-Danlos syndrome      Past Surgical History:   Procedure Laterality Date    HEMORRHOIDECTOMY      HERNIA REPAIR       Social History     Tobacco Use   Smoking Status Never   Smokeless Tobacco Not on file     Family History   Problem Relation Age of Onset    No Known Problems Father     No Known Problems Mother     Stroke Paternal Grandfather     Breast cancer Neg Hx     Ovarian cancer Neg Hx     Uterine cancer Neg Hx     Colon cancer Neg Hx     Prostate cancer Neg Hx     Pancreatic cancer Neg Hx      Prior to Admission medications    Medication Sig Start Date End Date Taking? Authorizing Provider   medroxyPROGESTERone (Provera) 10 MG tablet 5 po daily x 3d, then 2 po bid thereafter 9/30/24  Yes Sky Jung MD   Multiple Vitamin (MULTIVITAMINS PO) Take  by mouth.   Yes ProviderPedro MD   naproxen sodium (ALEVE) 220 MG tablet Take 1 tablet by mouth 2 (Two) Times a Day As Needed for Mild Pain.   Yes ProviderPedro MD   medroxyPROGESTERone (Depo-Provera) 150 MG/ML injection Inject 1 mL into the appropriate muscle as directed by prescriber Every 3 (Three) Months. 10/2/24   Socorro Valderrama MD   medroxyPROGESTERone (Provera) 10 MG tablet Take 1 tablet by mouth 3 times a day. 10/2/24   Socorro Valderrama MD   Relugolix-Estradiol-Norethind (Myfembree) 40-1-0.5 MG tablet Take 1 tablet by mouth Daily.  Patient not taking: Reported on 10/2/2024 9/10/24 10/2/24  Socorro Valderrama MD     ________________________________________    Review of Systems   Genitourinary:  Positive for menstrual problem and vaginal bleeding.          Objective     /60   Ht 162.6 cm (64.02\")   Wt 67.3 kg (148 lb 6.4 oz)   BMI 25.46 kg/m²    BP Readings from Last 3 Encounters:   10/02/24 120/60   09/30/24 107/69   09/17/24 104/78      Wt Readings from Last 3 Encounters:   10/02/24 67.3 kg (148 lb 6.4 oz)   09/30/24 67.1 kg (148 lb)   09/17/24 67.3 kg (148 lb 6.4 oz)        BMI: Estimated body mass index is 25.46 kg/m² as " "calculated from the following:    Height as of this encounter: 162.6 cm (64.02\").    Weight as of this encounter: 67.3 kg (148 lb 6.4 oz).    Physical Exam  Vitals and nursing note reviewed.   Constitutional:       General: She is not in acute distress.     Appearance: Normal appearance.   HENT:      Head: Normocephalic and atraumatic.   Eyes:      Extraocular Movements: Extraocular movements intact.   Cardiovascular:      Rate and Rhythm: Normal rate and regular rhythm.   Pulmonary:      Effort: Pulmonary effort is normal. No respiratory distress.   Abdominal:      General: There is no distension.      Palpations: Abdomen is soft. There is no mass.      Tenderness: There is no abdominal tenderness.   Musculoskeletal:         General: No swelling. Normal range of motion.      Cervical back: Normal range of motion.   Skin:     General: Skin is warm and dry.   Neurological:      General: No focal deficit present.      Mental Status: She is alert and oriented to person, place, and time.   Psychiatric:         Mood and Affect: Mood normal.         Behavior: Behavior normal.       Assessment:  Darlin Lantigua is a 49 y.o.  presenting for follow-up of abnormal uterine bleeding due to uterine fibroid.    Diagnoses and all orders for this visit:    1. Abnormal uterine bleeding (AUB) (Primary)  Overview:  Patient scheduled for urgent appointment after return of heavy bleeding as soon as Provera dose was tapered to 10 mg daily.  Patient counseled that IUD could potentially work as an option, but may not be able to be placed due to fundal fibroid.  Patient also hesitant as she had difficulty getting an appointment for removal previously and does not like the lack of control over that method.  Patient is averse to any estrogen-containing method.  Similarly does not like the lack of control over Nexplanon.  Hesitant to consider another mechanism outside of progesterone therapy such as TXA.  Tried Myfembree for 1 week with " "quick onset of debilitating pain and heavy menstrual bleeding with passage of several clots the size of \"an avocado\".  Offered patient addition of Depo-Provera with continuation of high-dose Provera for now with hopes that after receiving injection she can taper the oral Provera.  I did send her a refill of oral Provera just in case it is needed.  Regarding definitive surgical management, patient with understandable concerns as she is a single mom with a son.  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  States her employer does not have options for light or desk duty.  She is working on finding alternative employment.  She remains uncertain as to when she might want to schedule surgery but knows that she needs to at least finish the semester of grad school which ends mid December.  Pap smear and endometrial biopsy benign.  Patient to receive Depo injection 10/3/2024 with nurse visit.  Given patient has required several acute visits in this provider will be on maternity leave in December or January, will have patient establish care with Dr. San in case care is needed while this provider is out.  Will continue seeing patient while available for any acute visits or other needs.    Orders:  -     medroxyPROGESTERone (Depo-Provera) 150 MG/ML injection; Inject 1 mL into the appropriate muscle as directed by prescriber Every 3 (Three) Months.  Dispense: 1 mL; Refill: 3  -     CBC (No Diff); Future    2. Anemia due to acute blood loss  Assessment & Plan:  Will reassess patient's current hemoglobin with CBC.  She will have this collected when she returns 10/3/2024 for her Depo injection.      Other orders  -     medroxyPROGESTERone (Provera) 10 MG tablet; Take 1 tablet by mouth 3 times a day.  Dispense: 90 tablet; Refill: 3      Plan:  Return for soonest available nurse injection visit, also schedule f/u visit with new provider.    Socorro Valderrama MD  10/2/2024 16:36 EDT  "

## 2024-10-31 ENCOUNTER — TELEPHONE (OUTPATIENT)
Dept: OBSTETRICS AND GYNECOLOGY | Age: 49
End: 2024-10-31
Payer: MEDICAID

## 2024-10-31 NOTE — TELEPHONE ENCOUNTER
[facility] in [place] wants to know the status on a PA for medroxyprogesterone .

## 2024-11-01 NOTE — TELEPHONE ENCOUNTER
Called pt and lvmtcb about seeing if she needs a PA and if she's doing pills or an injection for birth control.

## 2024-12-13 ENCOUNTER — CLINICAL SUPPORT (OUTPATIENT)
Dept: OBSTETRICS AND GYNECOLOGY | Age: 49
End: 2024-12-13
Payer: MEDICAID

## 2024-12-13 DIAGNOSIS — Z13.9 SPECIAL SCREENING: Primary | ICD-10-CM

## 2024-12-13 DIAGNOSIS — Z30.42 DEPO-PROVERA CONTRACEPTIVE STATUS: ICD-10-CM

## 2024-12-13 LAB
B-HCG UR QL: NEGATIVE
EXPIRATION DATE: NORMAL
INTERNAL NEGATIVE CONTROL: NEGATIVE
INTERNAL POSITIVE CONTROL: POSITIVE
Lab: NORMAL

## 2024-12-13 RX ORDER — MEDROXYPROGESTERONE ACETATE 150 MG/ML
150 INJECTION, SUSPENSION INTRAMUSCULAR ONCE
Status: COMPLETED | OUTPATIENT
Start: 2024-12-13 | End: 2024-12-13

## 2024-12-13 RX ADMIN — MEDROXYPROGESTERONE ACETATE 150 MG: 150 INJECTION, SUSPENSION INTRAMUSCULAR at 13:36

## 2024-12-26 ENCOUNTER — OFFICE VISIT (OUTPATIENT)
Dept: OBSTETRICS AND GYNECOLOGY | Age: 49
End: 2024-12-26
Payer: MEDICAID

## 2024-12-26 VITALS
BODY MASS INDEX: 25.4 KG/M2 | DIASTOLIC BLOOD PRESSURE: 68 MMHG | WEIGHT: 148.8 LBS | HEIGHT: 64 IN | SYSTOLIC BLOOD PRESSURE: 120 MMHG

## 2024-12-26 DIAGNOSIS — D25.0 INTRAMURAL AND SUBMUCOUS LEIOMYOMA OF UTERUS: ICD-10-CM

## 2024-12-26 DIAGNOSIS — D25.1 INTRAMURAL AND SUBMUCOUS LEIOMYOMA OF UTERUS: ICD-10-CM

## 2024-12-26 DIAGNOSIS — N93.9 ABNORMAL UTERINE BLEEDING (AUB): Primary | ICD-10-CM

## 2024-12-26 PROBLEM — D21.9 FIBROIDS: Status: ACTIVE | Noted: 2024-12-26

## 2024-12-26 RX ORDER — SCOLOPAMINE TRANSDERMAL SYSTEM 1 MG/1
1 PATCH, EXTENDED RELEASE TRANSDERMAL CONTINUOUS
OUTPATIENT
Start: 2024-12-26 | End: 2024-12-29

## 2024-12-26 RX ORDER — SODIUM CHLORIDE 0.9 % (FLUSH) 0.9 %
10 SYRINGE (ML) INJECTION AS NEEDED
OUTPATIENT
Start: 2024-12-26

## 2024-12-26 RX ORDER — ACETAMINOPHEN 500 MG
1000 TABLET ORAL ONCE
OUTPATIENT
Start: 2024-12-26 | End: 2024-12-26

## 2024-12-26 RX ORDER — SODIUM CHLORIDE 0.9 % (FLUSH) 0.9 %
10 SYRINGE (ML) INJECTION EVERY 12 HOURS SCHEDULED
OUTPATIENT
Start: 2024-12-26

## 2024-12-26 RX ORDER — GABAPENTIN 100 MG/1
600 CAPSULE ORAL ONCE
OUTPATIENT
Start: 2024-12-26 | End: 2024-12-26

## 2024-12-26 RX ORDER — MEDROXYPROGESTERONE ACETATE 10 MG
20 TABLET ORAL 3 TIMES DAILY
Qty: 180 TABLET | Refills: 3 | Status: SHIPPED | OUTPATIENT
Start: 2024-12-26

## 2024-12-26 RX ORDER — SODIUM CHLORIDE 9 MG/ML
40 INJECTION, SOLUTION INTRAVENOUS AS NEEDED
OUTPATIENT
Start: 2024-12-26

## 2024-12-26 NOTE — PROGRESS NOTES
Psychiatric   Obstetrics and Gynecology     2024      Patient:  Darlin Lantigua   MR#:[id number]    Office note    Chief Complaint   Patient presents with    Follow-up     CC: surgical consult. Pt needing refill on progesterone.        Subjective     History of Present Illness  49 y.o. female  presenting to discuss surgery.  She had previously been seeing Dr. Valderrama.  She states that with her class schedule, etc. she will be ready for surgery in February. She has continued 20mg TID of Provera as that is the only thing that keeps her bleeding under control. She has an 8 cm fundal fibroid which does appear to be abutting the endometrial canal.       Relevant data reviewed: US Pelvis Transvaginal Non OB (2024 13:00)       Patient Active Problem List   Diagnosis    Abnormal uterine bleeding (AUB)    Special screening    Anemia due to acute blood loss    Fibroid uterus       Past Medical History:   Diagnosis Date    Jaden-Danlos syndrome      Past Surgical History:   Procedure Laterality Date    HEMORRHOIDECTOMY      HERNIA REPAIR       Obstetric History:  OB History          1    Para   1    Term   1            AB        Living   1         SAB        IAB        Ectopic        Molar        Multiple        Live Births   1               Menstrual History:     No LMP recorded.       # 1 - Date: , Sex: Female, Weight: 3459 g (7 lb 10 oz), GA: None, Type: Vaginal, Spontaneous, Apgar1: None, Apgar5: None, Living: Living, Birth Comments: None    Family History   Problem Relation Age of Onset    No Known Problems Father     No Known Problems Mother     Stroke Paternal Grandfather     Breast cancer Neg Hx     Ovarian cancer Neg Hx     Uterine cancer Neg Hx     Colon cancer Neg Hx     Prostate cancer Neg Hx     Pancreatic cancer Neg Hx      Social History     Tobacco Use    Smoking status: Never   Vaping Use    Vaping status: Never Used   Substance Use Topics    Alcohol use: Yes  "   Drug use: Never     Sulfa antibiotics    Current Outpatient Medications:     medroxyPROGESTERone (Depo-Provera) 150 MG/ML injection, Inject 1 mL into the appropriate muscle as directed by prescriber Every 3 (Three) Months., Disp: 1 mL, Rfl: 3    medroxyPROGESTERone (Provera) 10 MG tablet, 5 po daily x 3d, then 2 po bid thereafter, Disp: 90 tablet, Rfl: 0    medroxyPROGESTERone (Provera) 10 MG tablet, Take 2 tablets by mouth 3 times a day., Disp: 180 tablet, Rfl: 3    Multiple Vitamin (MULTIVITAMINS PO), Take  by mouth., Disp: , Rfl:     naproxen sodium (ALEVE) 220 MG tablet, Take 1 tablet by mouth 2 (Two) Times a Day As Needed for Mild Pain., Disp: , Rfl:       Review of Systems   All other systems reviewed and are negative.      BP Readings from Last 3 Encounters:   24 120/68   10/02/24 120/60   24 107/69      Wt Readings from Last 3 Encounters:   24 67.5 kg (148 lb 12.8 oz)   10/02/24 67.3 kg (148 lb 6.4 oz)   24 67.1 kg (148 lb)      BMI: Estimated body mass index is 25.72 kg/m² as calculated from the following:    Height as of this encounter: 162 cm (63.78\").    Weight as of this encounter: 67.5 kg (148 lb 12.8 oz). BSA: Estimated body surface area is 1.72 meters squared as calculated from the following:    Height as of this encounter: 162 cm (63.78\").    Weight as of this encounter: 67.5 kg (148 lb 12.8 oz).    Objective   Physical Exam  Vitals and nursing note reviewed.   Constitutional:       Appearance: Normal appearance.   HENT:      Head: Normocephalic and atraumatic.   Pulmonary:      Effort: Pulmonary effort is normal.   Neurological:      Mental Status: She is alert and oriented to person, place, and time.   Psychiatric:         Mood and Affect: Mood normal.         Assessment & Plan   49 y.o. female  presenting to discuss surgery.      Diagnoses and all orders for this visit:    1. Abnormal uterine bleeding (AUB) (Primary)  Overview:  24  Patient scheduled for " "urgent appointment after return of heavy bleeding as soon as Provera dose was tapered to 10 mg daily.  Patient counseled that IUD could potentially work as an option, but may not be able to be placed due to fundal fibroid.  Patient also hesitant as she had difficulty getting an appointment for removal previously and does not like the lack of control over that method.  Patient is averse to any estrogen-containing method.  Similarly does not like the lack of control over Nexplanon.  Hesitant to consider another mechanism outside of progesterone therapy such as TXA.  Tried Myfembree for 1 week with quick onset of debilitating pain and heavy menstrual bleeding with passage of several clots the size of \"an avocado\".  Offered patient addition of Depo-Provera with continuation of high-dose Provera for now with hopes that after receiving injection she can taper the oral Provera.  I did send her a refill of oral Provera just in case it is needed.  Regarding definitive surgical management, patient with understandable concerns as she is a single mom with a son.  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  States her employer does not have options for light or desk duty.  She is working on finding alternative employment.  She remains uncertain as to when she might want to schedule surgery but knows that she needs to at least finish the semester of grad school which ends mid December.  Pap smear and endometrial biopsy benign.  Patient to receive Depo injection 10/3/2024 with nurse visit.  Given patient has required several acute visits in this provider will be on maternity leave in December or January, will have patient establish care with Dr. San in case care is needed while this provider is out.  Will continue seeing patient while available for any acute visits or other needs.    12/26/24  - Patient states she will be ready in January.   - Still taking 20mg TID of Provera along with DMPA " "injections to control bleeding which she understands is not a good long term management plan.   -Minimally invasive approaches to hysterectomy were reviewed with the patient.  The surgical procedure was discussed with the patient in detail.  I discussed the risks of the surgical procedure including, but not limited to the risk of pain, bleeding, infection, and damage to internal organs.  In exceedingly rare cases, death has been reported from surgical complications involving hysterectomy.    -It is customary with this procedure to remove both fallopian tubes.  Research has suggested that fallopian tubes may be the source of a type of cancer that was previously attributed to the ovaries.  Also, it is usual practice to conserve the ovaries outside of significant pathology.   -In cases where extensive scar tissue, fibroids, or uncontrolled bleeding is encountered, it may become necessary to convert the procedure to an “open\" laparotomy hysterectomy involving a longer recovery. In addition, in her case she has a very large uterus, which based on imaging and prior vaginal deliveries I believe will reasonably be able to be removed vaginally. If that is not the case I did discuss the possibility of a mini-laparotomy with her and she understands this is a possibility.   -The procedure entails very close operative proximity to the bladder and ureters.  There is a risk of injury to these structures.  It is usual practice to inspect the bladder and ensure functioning ureters at the conclusion of the procedure using cystoscopy (camera in the bladder).  In exceptionally straightforward cases, selective cystoscopy may be employed to reduce the incidence of iatrogenic urinary tract infection  -In addition to routine postoperative instructions provided, all patients are advised that they MUST avoid vaginal penetration for 6-8 weeks postoperative until the upper vaginal cuff is inspected for proper healing.  There is a rare " incidence of vaginal cuff separation that can require emergent intervention.      Orders:  -     medroxyPROGESTERone (Provera) 10 MG tablet; Take 2 tablets by mouth 3 times a day.  Dispense: 180 tablet; Refill: 3        No follow-ups on file.    Radha San MD   12/26/2024 09:20 EST

## 2025-01-14 ENCOUNTER — TELEPHONE (OUTPATIENT)
Dept: OBSTETRICS AND GYNECOLOGY | Age: 50
End: 2025-01-14
Payer: MEDICAID

## 2025-01-14 NOTE — TELEPHONE ENCOUNTER
Spoke with pt she was driving when I called and going to call the office back  after lunch to schedule mammo

## 2025-01-14 NOTE — TELEPHONE ENCOUNTER
Left VM for pt about overdue mammogram from 8/22/24.  Left number to call and reschedule.  I will postpone for 1 month

## 2025-02-10 ENCOUNTER — PREP FOR SURGERY (OUTPATIENT)
Dept: OTHER | Facility: HOSPITAL | Age: 50
End: 2025-02-10
Payer: MEDICAID

## 2025-02-10 DIAGNOSIS — D25.9 UTERINE LEIOMYOMA, UNSPECIFIED LOCATION: ICD-10-CM

## 2025-02-10 DIAGNOSIS — N93.9 ABNORMAL UTERINE BLEEDING (AUB): Primary | ICD-10-CM

## 2025-02-10 RX ORDER — SODIUM CHLORIDE 9 MG/ML
40 INJECTION, SOLUTION INTRAVENOUS AS NEEDED
OUTPATIENT
Start: 2025-02-10

## 2025-02-10 RX ORDER — GABAPENTIN 300 MG/1
600 CAPSULE ORAL ONCE
OUTPATIENT
Start: 2025-02-10 | End: 2025-02-10

## 2025-02-10 RX ORDER — SODIUM CHLORIDE 0.9 % (FLUSH) 0.9 %
10 SYRINGE (ML) INJECTION AS NEEDED
OUTPATIENT
Start: 2025-02-10

## 2025-02-10 RX ORDER — ACETAMINOPHEN 500 MG
1000 TABLET ORAL ONCE
OUTPATIENT
Start: 2025-02-10 | End: 2025-02-10

## 2025-02-10 RX ORDER — SCOPOLAMINE 1 MG/3D
1 PATCH, EXTENDED RELEASE TRANSDERMAL CONTINUOUS
OUTPATIENT
Start: 2025-02-10 | End: 2025-02-13

## 2025-02-10 RX ORDER — SODIUM CHLORIDE 0.9 % (FLUSH) 0.9 %
3 SYRINGE (ML) INJECTION EVERY 12 HOURS SCHEDULED
OUTPATIENT
Start: 2025-02-10

## 2025-02-10 RX ORDER — PHENAZOPYRIDINE HYDROCHLORIDE 200 MG/1
200 TABLET, FILM COATED ORAL ONCE
OUTPATIENT
Start: 2025-02-10 | End: 2025-02-10

## 2025-02-28 ENCOUNTER — CLINICAL SUPPORT (OUTPATIENT)
Dept: OBSTETRICS AND GYNECOLOGY | Age: 50
End: 2025-02-28
Payer: MEDICAID

## 2025-02-28 DIAGNOSIS — Z30.013 ENCOUNTER FOR PRESCRIPTION FOR DEPO-PROVERA: Primary | ICD-10-CM

## 2025-02-28 DIAGNOSIS — N93.9 ABNORMAL UTERINE BLEEDING (AUB): ICD-10-CM

## 2025-02-28 RX ORDER — MEDROXYPROGESTERONE ACETATE 150 MG/ML
150 INJECTION, SUSPENSION INTRAMUSCULAR ONCE
Status: COMPLETED | OUTPATIENT
Start: 2025-02-28 | End: 2025-02-28

## 2025-02-28 RX ADMIN — MEDROXYPROGESTERONE ACETATE 150 MG: 150 INJECTION, SUSPENSION INTRAMUSCULAR at 13:42

## 2025-03-24 ENCOUNTER — PRE-ADMISSION TESTING (OUTPATIENT)
Dept: PREADMISSION TESTING | Facility: HOSPITAL | Age: 50
End: 2025-03-24
Payer: MEDICAID

## 2025-03-24 VITALS
DIASTOLIC BLOOD PRESSURE: 65 MMHG | OXYGEN SATURATION: 100 % | RESPIRATION RATE: 18 BRPM | HEIGHT: 64 IN | HEART RATE: 82 BPM | SYSTOLIC BLOOD PRESSURE: 107 MMHG | TEMPERATURE: 98.6 F | WEIGHT: 157.3 LBS | BODY MASS INDEX: 26.85 KG/M2

## 2025-03-24 DIAGNOSIS — D25.9 UTERINE LEIOMYOMA, UNSPECIFIED LOCATION: ICD-10-CM

## 2025-03-24 DIAGNOSIS — N93.9 ABNORMAL UTERINE BLEEDING (AUB): ICD-10-CM

## 2025-03-24 LAB
ABO GROUP BLD: NORMAL
ANION GAP SERPL CALCULATED.3IONS-SCNC: 10 MMOL/L (ref 5–15)
ANISOCYTOSIS BLD QL: NORMAL
BASOPHILS # BLD MANUAL: 0.07 10*3/MM3 (ref 0–0.2)
BASOPHILS NFR BLD MANUAL: 1 % (ref 0–1.5)
BLD GP AB SCN SERPL QL: NEGATIVE
BUN SERPL-MCNC: 17 MG/DL (ref 6–20)
BUN/CREAT SERPL: 15.9 (ref 7–25)
CALCIUM SPEC-SCNC: 8.8 MG/DL (ref 8.6–10.5)
CHLORIDE SERPL-SCNC: 107 MMOL/L (ref 98–107)
CO2 SERPL-SCNC: 20 MMOL/L (ref 22–29)
CREAT SERPL-MCNC: 1.07 MG/DL (ref 0.57–1)
DEPRECATED RDW RBC AUTO: 40.5 FL (ref 37–54)
EGFRCR SERPLBLD CKD-EPI 2021: 63.8 ML/MIN/1.73
EOSINOPHIL # BLD MANUAL: 0.33 10*3/MM3 (ref 0–0.4)
EOSINOPHIL NFR BLD MANUAL: 5 % (ref 0.3–6.2)
ERYTHROCYTE [DISTWIDTH] IN BLOOD BY AUTOMATED COUNT: 15.8 % (ref 12.3–15.4)
GIANT PLATELETS: NORMAL
GLUCOSE SERPL-MCNC: 103 MG/DL (ref 65–99)
HCT VFR BLD AUTO: 28.9 % (ref 34–46.6)
HGB BLD-MCNC: 8.5 G/DL (ref 12–15.9)
LYMPHOCYTES # BLD MANUAL: 1.65 10*3/MM3 (ref 0.7–3.1)
LYMPHOCYTES NFR BLD MANUAL: 5 % (ref 5–12)
MCH RBC QN AUTO: 20.9 PG (ref 26.6–33)
MCHC RBC AUTO-ENTMCNC: 29.4 G/DL (ref 31.5–35.7)
MCV RBC AUTO: 71.2 FL (ref 79–97)
MICROCYTES BLD QL: NORMAL
MONOCYTES # BLD: 0.33 10*3/MM3 (ref 0.1–0.9)
NEUTROPHILS # BLD AUTO: 4.21 10*3/MM3 (ref 1.7–7)
NEUTROPHILS NFR BLD MANUAL: 64 % (ref 42.7–76)
NRBC BLD AUTO-RTO: 0 /100 WBC (ref 0–0.2)
PLATELET # BLD AUTO: 349 10*3/MM3 (ref 140–450)
PMV BLD AUTO: 8.5 FL (ref 6–12)
POTASSIUM SERPL-SCNC: 4.1 MMOL/L (ref 3.5–5.2)
RBC # BLD AUTO: 4.06 10*6/MM3 (ref 3.77–5.28)
RH BLD: NEGATIVE
SODIUM SERPL-SCNC: 137 MMOL/L (ref 136–145)
T&S EXPIRATION DATE: NORMAL
VARIANT LYMPHS NFR BLD MANUAL: 25 % (ref 19.6–45.3)
WBC MORPH BLD: NORMAL
WBC NRBC COR # BLD AUTO: 6.58 10*3/MM3 (ref 3.4–10.8)

## 2025-03-24 PROCEDURE — 86900 BLOOD TYPING SEROLOGIC ABO: CPT

## 2025-03-24 PROCEDURE — 80048 BASIC METABOLIC PNL TOTAL CA: CPT

## 2025-03-24 PROCEDURE — 36415 COLL VENOUS BLD VENIPUNCTURE: CPT

## 2025-03-24 PROCEDURE — 86901 BLOOD TYPING SEROLOGIC RH(D): CPT

## 2025-03-24 PROCEDURE — 85007 BL SMEAR W/DIFF WBC COUNT: CPT

## 2025-03-24 PROCEDURE — 85025 COMPLETE CBC W/AUTO DIFF WBC: CPT

## 2025-03-24 PROCEDURE — 86850 RBC ANTIBODY SCREEN: CPT

## 2025-03-24 NOTE — DISCHARGE INSTRUCTIONS
Take the following medications the morning of surgery:  NONE    If you are on prescription narcotic pain medication to control your pain you may also take that medication the morning of surgery.      General Instructions:     Do not eat solid food after midnight the night before surgery.  Clear liquids day of surgery are allowed but must be stopped at least two hours before your hospital arrival time.       Allowed clear liquids      Water, sodas, and tea or coffee with no cream or milk added.       12 to 20 ounces of a clear liquid that contains carbohydrates is recommended.  If non-diabetic, have Gatorade or Powerade.  If diabetic, have G2 or Powerade Zero.     Do not have liquids red in color.  Do not consume chicken, beef, pork or vegetable broth or bouillon cubes of any variety as they are not considered clear liquids and are not allowed.      Infants may have breast milk up to four hours before surgery.  Infants drinking formula may drink formula up to six hours before surgery.   Patients who avoid smoking, chewing tobacco and alcohol for 4 weeks prior to surgery have a reduced risk of post-operative complications.  Quit smoking as many days before surgery as you can.  Do not smoke, use chewing tobacco or drink alcohol the day of surgery.   If applicable bring your C-PAP/ BI-PAP machine in with you to preop day of surgery.  Bring any papers given to you in the doctor’s office.  Wear clean comfortable clothes.  Do not wear contact lenses, false eyelashes or make-up.  Bring a case for your glasses.   Bring crutches or walker if applicable.  Remove all piercings.  Leave jewelry and any other valuables at home.  Hair extensions with metal clips must be removed prior to surgery.  The Pre-Admission Testing nurse will instruct you to bring medications if unable to obtain an accurate list in Pre-Admission Testing.    Day of surgery you will need to let the preoperative nurse know the last time you took each of your  medications.  To ensure a safe environment for patients and staff, we kindly ask that children under the age of 16 not accompany patients.  If you must bring a dependent child or dependent adult please ensure a responsible adult, other than yourself, is present to supervise them.      If you were given a blood bank ID arm band remember to bring it with you the day of surgery.    Preventing a Surgical Site Infection:  For 2 to 3 days before surgery, avoid shaving with a razor because the razor can irritate skin and make it easier to develop an infection.    Any areas of open skin can increase the risk of a post-operative wound infection by allowing bacteria to enter and travel throughout the body.  Notify your surgeon if you have any skin wounds / rashes even if it is not near the expected surgical site.  The area will need assessed to determine if surgery should be delayed until it is healed.  The night prior to surgery shower using a fresh bar of anti-bacterial soap (such as Dial) and clean washcloth.  Sleep in a clean bed with clean clothing.  Do not allow pets to sleep with you.  Shower on the morning of surgery using a fresh bar of anti-bacterial soap (such as Dial) and clean washcloth.  Dry with a clean towel and dress in clean clothing.  Ask your surgeon if you will be receiving antibiotics prior to surgery.  Make sure you, your family, and all healthcare providers clean their hands with soap and water or an alcohol based hand  before caring for you or your wound.      CHLORHEXIDINE CLOTH INSTRUCTIONS  The morning of surgery follow these instructions using the Chlorhexidine cloths you've been given.  These steps reduce bacteria on the body.  Do not use the cloths near your eyes, ears mouth, genitalia or on open wounds.  Throw the cloths away after use but do not try to flush them down a toilet.      Open and remove one cloth at a time from the package.    Leave the cloth unfolded and begin the  bathing.  Massage the skin with the cloths using gentle pressure to remove bacteria.  Do not scrub harshly.   Follow the steps below with one 2% CHG cloth per area (6 total cloths).  One cloth for neck, shoulders and chest.  One cloth for both arms, hands, fingers and underarms (do underarms last).  One cloth for the abdomen followed by groin.  One cloth for right leg and foot including between the toes.  One cloth for left leg and foot including between the toes.  The last cloth is to be used for the back of the neck, back and buttocks.    Allow the CHG to air dry 3 minutes on the skin which will give it time to work and decrease the chance of irritation.  The skin may feel sticky until it is dry.  Do not rinse with water or any other liquid or you will lose the beneficial effects of the CHG.  If mild skin irritation occurs, do rinse the skin to remove the CHG.  Report this to the nurse at time of admission.  Do not apply lotions, creams, ointments, deodorants or perfumes after using the clothes. Dress in clean clothes before coming to the hospital.      Day of surgery:  Your arrival time is approximately two hours before your scheduled surgery time.  Please note if you have an early arrival time the surgery doors do not open before 5:00 AM.  Upon arrival, a Pre-op nurse and Anesthesiologist will review your health history, obtain vital signs, and answer questions you may have.  The only belongings needed at this time will be a list of your home medications and if applicable your C-PAP/BI-PAP machine.  A Pre-op nurse will start an IV and you may receive medication in preparation for surgery, including something to help you relax.     Please be aware that surgery does come with discomfort.  We want to make every effort to control your discomfort so please discuss any uncontrolled symptoms with your nurse.   Your doctor will most likely have prescribed pain medications.      If you are going home after surgery you  will receive individualized written care instructions before being discharged.  A responsible adult must drive you to and from the hospital on the day of your surgery and ideally stay with you through the night.   .  Discharge prescriptions can be filled by the hospital pharmacy during regular pharmacy hours.  If you are having surgery late in the day/evening your prescription may be e-prescribed to your pharmacy.  Please verify your pharmacy hours or chose a 24 hour pharmacy to avoid not having access to your prescription because your pharmacy has closed for the day.    If you are staying overnight following surgery, you will be transported to your hospital room following the recovery period.  [facility] has all private rooms.    If you have any questions please call Pre-Admission Testing at [phone].  Deductibles and co-payments are collected on the day of service. Please be prepared to pay the required co-pay, deductible or deposit on the day of service as defined by your plan.    Call your surgeon immediately if you experience any of the following symptoms:  Sore Throat  Shortness of Breath or difficulty breathing  Cough  Chills  Body soreness or muscle pain  Headache  Fever  New loss of taste or smell  Do not arrive for your surgery ill.  Your procedure will need to be rescheduled to another time.  You will need to call your physician before the day of surgery to avoid any unnecessary exposure to hospital staff as well as other patients.

## 2025-03-30 PROCEDURE — S0260 H&P FOR SURGERY: HCPCS | Performed by: STUDENT IN AN ORGANIZED HEALTH CARE EDUCATION/TRAINING PROGRAM

## 2025-03-30 NOTE — H&P
[facility]   Obstetrics and Gynecology   Surgery H&P     3/31/2025      Patient: Darlin Lantigua                                                MR#:[id number]     History of Present Illness     49 y.o. female  who presents for scheduled RA-TLH, BS, possible cystoscopy for abnormal uterine bleeding to anemia due to uterine fibroid.     Today feels well - ready for surgery. Has had minimal bleeding with depo + 20mg provera TID.    In summary, patient has been seen several times since 2024 for heavy bleeding to anemia due to her large fibroid. She has utilized multiple medical therapies including IUD, oral provera, myfembree without control of symptoms. Her bleeding has previously been manageable with high dose provera and IM depo provera. She had to delay surgery due to school and transitioning jobs in addition to being a single mom.      Obstetric History:  OB History            1    Para   1    Term   1            AB        Living   1           SAB        IAB        Ectopic        Molar        Multiple        Live Births   1                ________________________________________  Problem List       Patient Active Problem List   Diagnosis    Abnormal uterine bleeding (AUB)    Special screening    Anemia due to acute blood loss    Fibroid uterus         Medical History        Past Medical History:   Diagnosis Date    Jaden-Danlos syndrome           Surgical History         Past Surgical History:   Procedure Laterality Date    HEMORRHOIDECTOMY        HERNIA REPAIR             Tobacco Use History   Social History          Tobacco Use   Smoking Status Never   Smokeless Tobacco Not on file               Family History   Problem Relation Age of Onset    No Known Problems Father      No Known Problems Mother      Stroke Paternal Grandfather      Breast cancer Neg Hx      Ovarian cancer Neg Hx      Uterine cancer Neg Hx      Colon cancer Neg Hx      Prostate cancer Neg Hx       Pancreatic cancer Neg Hx        Current Outpatient Medications   Medication Instructions    medroxyPROGESTERone (DEPO-PROVERA) 150 mg, Intramuscular, Every 3 Months    medroxyPROGESTERone (PROVERA) 20 mg, Oral, 3 times daily    Multiple Vitamin (MULTIVITAMINS PO) 1 tablet, Oral, Daily, HOLD FOR SURGERY    naproxen sodium (ALEVE) 220 mg, 2 Times Daily PRN       ________________________________________     Review of Systems   Genitourinary:  Positive for menstrual problem     Objective  Vitals:    25 0629   BP: 116/64   Pulse: 86   Resp: 18   Temp: 98.9 °F (37.2 °C)   SpO2: 99%         Physical Exam  Vitals and nursing note reviewed.   Constitutional:       General: She is not in acute distress.     Appearance: Normal appearance.   HENT:      Head: Normocephalic and atraumatic.   Eyes:      Extraocular Movements: Extraocular movements intact.   Cardiovascular:      Rate and Rhythm: Normal rate and regular rhythm.   Pulmonary:      Effort: Pulmonary effort is normal. No respiratory distress.   Abdominal:      General: There is no distension.      Palpations: Abdomen is soft. There is no mass.      Tenderness: There is no abdominal tenderness.   Musculoskeletal:         General: No swelling. Normal range of motion.      Cervical back: Normal range of motion.   Skin:     General: Skin is warm and dry.   Neurological:      General: No focal deficit present.      Mental Status: She is alert and oriented to person, place, and time.   Psychiatric:         Mood and Affect: Mood normal.         Behavior: Behavior normal.         Assessment:  Darlin Lantigua is a 49 y.o. female  who presents for scheduled RA-TLH, BS, possible cystoscopy for abnormal uterine bleeding to anemia due to uterine fibroid.     Abnormal uterine bleeding   Anemia due to acute blood loss  Uterine fibroid  Overview:  Patient with 8cm fundal fibroid causing significant heavy bleeding leading to severe anemia. Patient has failed or not  "tolerated multiple medical therapies - IUD, provera, myfembree. She has required high dose provera in addition to IM depo provera to control bleeding which is not a feasible long-term management option. She desires to proceed with definitive management via RA-TLH, BS, possible cystoscopy. We have discussed she is at increased risk of bleeding due to her fibroid and therefore, given her anemia, at higher risk of possibly requiring transfusion which she is agreeable to. She has been consented as below.    -Minimally invasive approaches to hysterectomy were reviewed with the patient.  The surgical procedure was discussed with the patient in detail.  I discussed the risks of the surgical procedure including, but not limited to the risk of pain, bleeding, infection, and damage to internal organs.  In exceedingly rare cases, death has been reported from surgical complications involving hysterectomy.    -It is customary with this procedure to remove both fallopian tubes.  Research has suggested that fallopian tubes may be the source of a type of cancer that was previously attributed to the ovaries.  Also, it is usual practice to conserve the ovaries outside of significant pathology.   -In cases where extensive scar tissue, fibroids, or uncontrolled bleeding is encountered, it may become necessary to convert the procedure to an “open\" laparotomy hysterectomy involving a longer recovery. In addition, in her case she has a very large uterus, which based on imaging and prior vaginal deliveries I believe will reasonably be able to be removed vaginally. If that is not the case I did discuss the possibility of a mini-laparotomy with her and she understands this is a possibility.   -The procedure entails very close operative proximity to the bladder and ureters.  There is a risk of injury to these structures.  It is usual practice to inspect the bladder and ensure functioning ureters at the conclusion of the procedure using " cystoscopy (camera in the bladder).  In exceptionally straightforward cases, selective cystoscopy may be employed to reduce the incidence of iatrogenic urinary tract infection  -In addition to routine postoperative instructions provided, all patients are advised that they MUST avoid vaginal penetration for 6-8 weeks postoperative until the upper vaginal cuff is inspected for proper healing.  There is a rare incidence of vaginal cuff separation that can require emergent intervention.        Plan:  To OR for scheduled procedure, anticipate same day discharge     Socorro Valderrama MD  3/31/2025 06:56 EDT

## 2025-03-31 ENCOUNTER — ANESTHESIA (OUTPATIENT)
Dept: PERIOP | Facility: HOSPITAL | Age: 50
End: 2025-03-31
Payer: MEDICAID

## 2025-03-31 ENCOUNTER — HOSPITAL ENCOUNTER (OUTPATIENT)
Facility: HOSPITAL | Age: 50
Discharge: HOME | End: 2025-03-31
Attending: STUDENT IN AN ORGANIZED HEALTH CARE EDUCATION/TRAINING PROGRAM | Admitting: STUDENT IN AN ORGANIZED HEALTH CARE EDUCATION/TRAINING PROGRAM
Payer: MEDICAID

## 2025-03-31 ENCOUNTER — ANESTHESIA EVENT (OUTPATIENT)
Dept: PERIOP | Facility: HOSPITAL | Age: 50
End: 2025-03-31
Payer: MEDICAID

## 2025-03-31 VITALS
HEART RATE: 56 BPM | DIASTOLIC BLOOD PRESSURE: 77 MMHG | SYSTOLIC BLOOD PRESSURE: 121 MMHG | OXYGEN SATURATION: 95 % | TEMPERATURE: 97.8 F | RESPIRATION RATE: 18 BRPM

## 2025-03-31 DIAGNOSIS — Z90.710 S/P HYSTERECTOMY: Primary | ICD-10-CM

## 2025-03-31 DIAGNOSIS — D25.1 INTRAMURAL AND SUBMUCOUS LEIOMYOMA OF UTERUS: ICD-10-CM

## 2025-03-31 DIAGNOSIS — D25.9 UTERINE LEIOMYOMA, UNSPECIFIED LOCATION: ICD-10-CM

## 2025-03-31 DIAGNOSIS — D25.0 INTRAMURAL AND SUBMUCOUS LEIOMYOMA OF UTERUS: ICD-10-CM

## 2025-03-31 DIAGNOSIS — N93.9 ABNORMAL UTERINE BLEEDING (AUB): ICD-10-CM

## 2025-03-31 LAB
ABO GROUP BLD: NORMAL
B-HCG UR QL: NEGATIVE
BLD GP AB SCN SERPL QL: NEGATIVE
EXPIRATION DATE: NORMAL
HCT VFR BLD AUTO: 26.7 % (ref 34–46.6)
HGB BLD-MCNC: 7.8 G/DL (ref 12–15.9)
INTERNAL NEGATIVE CONTROL: NEGATIVE
INTERNAL POSITIVE CONTROL: POSITIVE
Lab: NORMAL
RH BLD: NEGATIVE
T&S EXPIRATION DATE: NORMAL

## 2025-03-31 PROCEDURE — 25010000002 LIDOCAINE 2% SOLUTION

## 2025-03-31 PROCEDURE — 25010000002 CEFAZOLIN PER 500 MG: Performed by: STUDENT IN AN ORGANIZED HEALTH CARE EDUCATION/TRAINING PROGRAM

## 2025-03-31 PROCEDURE — 25810000003 LACTATED RINGERS PER 1000 ML: Performed by: ANESTHESIOLOGY

## 2025-03-31 PROCEDURE — 25010000003 FAMOTIDINE 10 MG/ML SOLUTION: Performed by: ANESTHESIOLOGY

## 2025-03-31 PROCEDURE — 58573 TLH W/T/O UTERUS OVER 250 G: CPT | Performed by: STUDENT IN AN ORGANIZED HEALTH CARE EDUCATION/TRAINING PROGRAM

## 2025-03-31 PROCEDURE — 25010000002 ONDANSETRON PER 1 MG

## 2025-03-31 PROCEDURE — 25010000002 BUPIVACAINE LIPOSOME 1.3 % SUSPENSION: Performed by: ANESTHESIOLOGY

## 2025-03-31 PROCEDURE — 88307 TISSUE EXAM BY PATHOLOGIST: CPT | Performed by: STUDENT IN AN ORGANIZED HEALTH CARE EDUCATION/TRAINING PROGRAM

## 2025-03-31 PROCEDURE — 25010000002 SUGAMMADEX 200 MG/2ML SOLUTION

## 2025-03-31 PROCEDURE — 25010000002 ESMOLOL 100 MG/10ML SOLUTION

## 2025-03-31 PROCEDURE — 85018 HEMOGLOBIN: CPT | Performed by: STUDENT IN AN ORGANIZED HEALTH CARE EDUCATION/TRAINING PROGRAM

## 2025-03-31 PROCEDURE — 25810000003 SODIUM CHLORIDE PER 500 ML: Performed by: STUDENT IN AN ORGANIZED HEALTH CARE EDUCATION/TRAINING PROGRAM

## 2025-03-31 PROCEDURE — 25010000002 FENTANYL CITRATE (PF) 50 MCG/ML SOLUTION

## 2025-03-31 PROCEDURE — 86900 BLOOD TYPING SEROLOGIC ABO: CPT | Performed by: STUDENT IN AN ORGANIZED HEALTH CARE EDUCATION/TRAINING PROGRAM

## 2025-03-31 PROCEDURE — 25010000002 PROPOFOL 10 MG/ML EMULSION

## 2025-03-31 PROCEDURE — 25010000002 DEXAMETHASONE SODIUM PHOSPHATE 20 MG/5ML SOLUTION

## 2025-03-31 PROCEDURE — 25010000002 HYDROMORPHONE PER 4 MG

## 2025-03-31 PROCEDURE — 25010000002 KETOROLAC TROMETHAMINE PER 15 MG

## 2025-03-31 PROCEDURE — 85014 HEMATOCRIT: CPT | Performed by: STUDENT IN AN ORGANIZED HEALTH CARE EDUCATION/TRAINING PROGRAM

## 2025-03-31 PROCEDURE — 86850 RBC ANTIBODY SCREEN: CPT | Performed by: STUDENT IN AN ORGANIZED HEALTH CARE EDUCATION/TRAINING PROGRAM

## 2025-03-31 PROCEDURE — S2900 ROBOTIC SURGICAL SYSTEM: HCPCS | Performed by: STUDENT IN AN ORGANIZED HEALTH CARE EDUCATION/TRAINING PROGRAM

## 2025-03-31 PROCEDURE — 81025 URINE PREGNANCY TEST: CPT | Performed by: STUDENT IN AN ORGANIZED HEALTH CARE EDUCATION/TRAINING PROGRAM

## 2025-03-31 PROCEDURE — 86901 BLOOD TYPING SEROLOGIC RH(D): CPT | Performed by: STUDENT IN AN ORGANIZED HEALTH CARE EDUCATION/TRAINING PROGRAM

## 2025-03-31 PROCEDURE — G0378 HOSPITAL OBSERVATION PER HR: HCPCS

## 2025-03-31 PROCEDURE — 25010000002 GLYCOPYRROLATE 1 MG/5ML SOLUTION

## 2025-03-31 DEVICE — KNOTLESS TISSUE CONTROL DEVICE, VIOLET UNIDIRECTIONAL (ANTIBACTERIAL) SYNTHETIC ABSORBABLE DEVICE
Type: IMPLANTABLE DEVICE | Site: ABDOMEN | Status: FUNCTIONAL
Brand: STRATAFIX

## 2025-03-31 DEVICE — ARISTA AH ABSORBABLE HEMOSTATIC PARTICLES, 3G BELLOWS CONTAINER
Type: IMPLANTABLE DEVICE | Site: ABDOMEN | Status: FUNCTIONAL
Brand: ARISTA

## 2025-03-31 RX ORDER — PROPOFOL 10 MG/ML
VIAL (ML) INTRAVENOUS AS NEEDED
Status: DISCONTINUED | OUTPATIENT
Start: 2025-03-31 | End: 2025-03-31 | Stop reason: SURG

## 2025-03-31 RX ORDER — SODIUM CHLORIDE 0.9 % (FLUSH) 0.9 %
10 SYRINGE (ML) INJECTION EVERY 12 HOURS SCHEDULED
Status: DISCONTINUED | OUTPATIENT
Start: 2025-03-31 | End: 2025-03-31 | Stop reason: HOSPADM

## 2025-03-31 RX ORDER — IPRATROPIUM BROMIDE AND ALBUTEROL SULFATE 2.5; .5 MG/3ML; MG/3ML
3 SOLUTION RESPIRATORY (INHALATION) ONCE AS NEEDED
Status: DISCONTINUED | OUTPATIENT
Start: 2025-03-31 | End: 2025-03-31 | Stop reason: HOSPADM

## 2025-03-31 RX ORDER — HYDRALAZINE HYDROCHLORIDE 20 MG/ML
5 INJECTION INTRAMUSCULAR; INTRAVENOUS
Status: DISCONTINUED | OUTPATIENT
Start: 2025-03-31 | End: 2025-03-31 | Stop reason: HOSPADM

## 2025-03-31 RX ORDER — PHENAZOPYRIDINE HYDROCHLORIDE 200 MG/1
200 TABLET, FILM COATED ORAL ONCE
Status: COMPLETED | OUTPATIENT
Start: 2025-03-31 | End: 2025-03-31

## 2025-03-31 RX ORDER — SODIUM CHLORIDE 0.9 % (FLUSH) 0.9 %
10 SYRINGE (ML) INJECTION AS NEEDED
Status: DISCONTINUED | OUTPATIENT
Start: 2025-03-31 | End: 2025-03-31 | Stop reason: HOSPADM

## 2025-03-31 RX ORDER — OXYCODONE AND ACETAMINOPHEN 7.5; 325 MG/1; MG/1
1 TABLET ORAL EVERY 4 HOURS PRN
Status: DISCONTINUED | OUTPATIENT
Start: 2025-03-31 | End: 2025-03-31 | Stop reason: HOSPADM

## 2025-03-31 RX ORDER — ROCURONIUM BROMIDE 10 MG/ML
INJECTION, SOLUTION INTRAVENOUS AS NEEDED
Status: DISCONTINUED | OUTPATIENT
Start: 2025-03-31 | End: 2025-03-31 | Stop reason: SURG

## 2025-03-31 RX ORDER — AMOXICILLIN 250 MG
1 CAPSULE ORAL DAILY
Qty: 14 TABLET | Refills: 0 | Status: SHIPPED | OUTPATIENT
Start: 2025-03-31

## 2025-03-31 RX ORDER — SODIUM CHLORIDE 0.9 % (FLUSH) 0.9 %
3 SYRINGE (ML) INJECTION EVERY 12 HOURS SCHEDULED
Status: DISCONTINUED | OUTPATIENT
Start: 2025-03-31 | End: 2025-03-31 | Stop reason: HOSPADM

## 2025-03-31 RX ORDER — HYDROMORPHONE HYDROCHLORIDE 1 MG/ML
0.5 INJECTION, SOLUTION INTRAMUSCULAR; INTRAVENOUS; SUBCUTANEOUS
Status: DISCONTINUED | OUTPATIENT
Start: 2025-03-31 | End: 2025-03-31 | Stop reason: HOSPADM

## 2025-03-31 RX ORDER — ONDANSETRON 2 MG/ML
4 INJECTION INTRAMUSCULAR; INTRAVENOUS ONCE AS NEEDED
Status: DISCONTINUED | OUTPATIENT
Start: 2025-03-31 | End: 2025-03-31 | Stop reason: HOSPADM

## 2025-03-31 RX ORDER — SCOPOLAMINE 1 MG/3D
1 PATCH, EXTENDED RELEASE TRANSDERMAL CONTINUOUS
Status: DISCONTINUED | OUTPATIENT
Start: 2025-03-31 | End: 2025-03-31 | Stop reason: HOSPADM

## 2025-03-31 RX ORDER — DEXAMETHASONE SODIUM PHOSPHATE 4 MG/ML
INJECTION, SOLUTION INTRA-ARTICULAR; INTRALESIONAL; INTRAMUSCULAR; INTRAVENOUS; SOFT TISSUE AS NEEDED
Status: DISCONTINUED | OUTPATIENT
Start: 2025-03-31 | End: 2025-03-31 | Stop reason: SURG

## 2025-03-31 RX ORDER — ACETAMINOPHEN 500 MG
1000 TABLET ORAL ONCE
Status: COMPLETED | OUTPATIENT
Start: 2025-03-31 | End: 2025-03-31

## 2025-03-31 RX ORDER — OXYCODONE HYDROCHLORIDE 5 MG/1
5 TABLET ORAL EVERY 6 HOURS PRN
Qty: 12 TABLET | Refills: 0 | Status: SHIPPED | OUTPATIENT
Start: 2025-03-31

## 2025-03-31 RX ORDER — SODIUM CHLORIDE 9 MG/ML
INJECTION, SOLUTION INTRAVENOUS AS NEEDED
Status: DISCONTINUED | OUTPATIENT
Start: 2025-03-31 | End: 2025-03-31 | Stop reason: HOSPADM

## 2025-03-31 RX ORDER — SODIUM CHLORIDE 0.9 % (FLUSH) 0.9 %
3-10 SYRINGE (ML) INJECTION AS NEEDED
Status: DISCONTINUED | OUTPATIENT
Start: 2025-03-31 | End: 2025-03-31 | Stop reason: HOSPADM

## 2025-03-31 RX ORDER — PROMETHAZINE HYDROCHLORIDE 25 MG/1
25 SUPPOSITORY RECTAL ONCE AS NEEDED
Status: DISCONTINUED | OUTPATIENT
Start: 2025-03-31 | End: 2025-03-31 | Stop reason: HOSPADM

## 2025-03-31 RX ORDER — SODIUM CHLORIDE 9 MG/ML
40 INJECTION, SOLUTION INTRAVENOUS AS NEEDED
Status: DISCONTINUED | OUTPATIENT
Start: 2025-03-31 | End: 2025-03-31 | Stop reason: HOSPADM

## 2025-03-31 RX ORDER — FENTANYL CITRATE 50 UG/ML
50 INJECTION, SOLUTION INTRAMUSCULAR; INTRAVENOUS
Status: DISCONTINUED | OUTPATIENT
Start: 2025-03-31 | End: 2025-03-31 | Stop reason: HOSPADM

## 2025-03-31 RX ORDER — GLYCOPYRROLATE 0.2 MG/ML
INJECTION INTRAMUSCULAR; INTRAVENOUS AS NEEDED
Status: DISCONTINUED | OUTPATIENT
Start: 2025-03-31 | End: 2025-03-31 | Stop reason: SURG

## 2025-03-31 RX ORDER — DROPERIDOL 2.5 MG/ML
0.62 INJECTION, SOLUTION INTRAMUSCULAR; INTRAVENOUS
Status: DISCONTINUED | OUTPATIENT
Start: 2025-03-31 | End: 2025-03-31 | Stop reason: HOSPADM

## 2025-03-31 RX ORDER — HYDROCODONE BITARTRATE AND ACETAMINOPHEN 5; 325 MG/1; MG/1
1 TABLET ORAL ONCE AS NEEDED
Status: DISCONTINUED | OUTPATIENT
Start: 2025-03-31 | End: 2025-03-31 | Stop reason: HOSPADM

## 2025-03-31 RX ORDER — ESMOLOL HYDROCHLORIDE 10 MG/ML
INJECTION INTRAVENOUS AS NEEDED
Status: DISCONTINUED | OUTPATIENT
Start: 2025-03-31 | End: 2025-03-31 | Stop reason: SURG

## 2025-03-31 RX ORDER — LIDOCAINE HYDROCHLORIDE 20 MG/ML
INJECTION, SOLUTION INFILTRATION; PERINEURAL AS NEEDED
Status: DISCONTINUED | OUTPATIENT
Start: 2025-03-31 | End: 2025-03-31 | Stop reason: SURG

## 2025-03-31 RX ORDER — GABAPENTIN 300 MG/1
600 CAPSULE ORAL ONCE
Status: DISCONTINUED | OUTPATIENT
Start: 2025-03-31 | End: 2025-03-31 | Stop reason: HOSPADM

## 2025-03-31 RX ORDER — KETOROLAC TROMETHAMINE 30 MG/ML
INJECTION, SOLUTION INTRAMUSCULAR; INTRAVENOUS AS NEEDED
Status: DISCONTINUED | OUTPATIENT
Start: 2025-03-31 | End: 2025-03-31 | Stop reason: SURG

## 2025-03-31 RX ORDER — FENTANYL CITRATE 50 UG/ML
50 INJECTION, SOLUTION INTRAMUSCULAR; INTRAVENOUS ONCE AS NEEDED
Status: DISCONTINUED | OUTPATIENT
Start: 2025-03-31 | End: 2025-03-31 | Stop reason: HOSPADM

## 2025-03-31 RX ORDER — FLUMAZENIL 0.1 MG/ML
0.2 INJECTION INTRAVENOUS AS NEEDED
Status: DISCONTINUED | OUTPATIENT
Start: 2025-03-31 | End: 2025-03-31 | Stop reason: HOSPADM

## 2025-03-31 RX ORDER — MIDAZOLAM HYDROCHLORIDE 1 MG/ML
1 INJECTION, SOLUTION INTRAMUSCULAR; INTRAVENOUS
Status: DISCONTINUED | OUTPATIENT
Start: 2025-03-31 | End: 2025-03-31 | Stop reason: HOSPADM

## 2025-03-31 RX ORDER — LIDOCAINE HYDROCHLORIDE 10 MG/ML
0.5 INJECTION, SOLUTION INFILTRATION; PERINEURAL ONCE AS NEEDED
Status: DISCONTINUED | OUTPATIENT
Start: 2025-03-31 | End: 2025-03-31 | Stop reason: HOSPADM

## 2025-03-31 RX ORDER — LABETALOL HYDROCHLORIDE 5 MG/ML
5 INJECTION, SOLUTION INTRAVENOUS
Status: DISCONTINUED | OUTPATIENT
Start: 2025-03-31 | End: 2025-03-31 | Stop reason: HOSPADM

## 2025-03-31 RX ORDER — DIPHENHYDRAMINE HYDROCHLORIDE 50 MG/ML
12.5 INJECTION, SOLUTION INTRAMUSCULAR; INTRAVENOUS
Status: DISCONTINUED | OUTPATIENT
Start: 2025-03-31 | End: 2025-03-31 | Stop reason: HOSPADM

## 2025-03-31 RX ORDER — ONDANSETRON 2 MG/ML
INJECTION INTRAMUSCULAR; INTRAVENOUS AS NEEDED
Status: DISCONTINUED | OUTPATIENT
Start: 2025-03-31 | End: 2025-03-31 | Stop reason: SURG

## 2025-03-31 RX ORDER — FERROUS SULFATE 325(65) MG
325 TABLET ORAL EVERY OTHER DAY
Qty: 60 TABLET | Refills: 0 | Status: SHIPPED | OUTPATIENT
Start: 2025-03-31

## 2025-03-31 RX ORDER — NALOXONE HCL 0.4 MG/ML
0.2 VIAL (ML) INJECTION AS NEEDED
Status: DISCONTINUED | OUTPATIENT
Start: 2025-03-31 | End: 2025-03-31 | Stop reason: HOSPADM

## 2025-03-31 RX ORDER — EPHEDRINE SULFATE 50 MG/ML
5 INJECTION, SOLUTION INTRAVENOUS ONCE AS NEEDED
Status: DISCONTINUED | OUTPATIENT
Start: 2025-03-31 | End: 2025-03-31 | Stop reason: HOSPADM

## 2025-03-31 RX ORDER — ACETAMINOPHEN 500 MG
1000 TABLET ORAL EVERY 6 HOURS PRN
Qty: 60 TABLET | Refills: 0 | Status: SHIPPED | OUTPATIENT
Start: 2025-03-31

## 2025-03-31 RX ORDER — GABAPENTIN 300 MG/1
600 CAPSULE ORAL ONCE
Status: COMPLETED | OUTPATIENT
Start: 2025-03-31 | End: 2025-03-31

## 2025-03-31 RX ORDER — BUPIVACAINE HYDROCHLORIDE AND EPINEPHRINE 5; 5 MG/ML; UG/ML
INJECTION, SOLUTION EPIDURAL; INTRACAUDAL; PERINEURAL
Status: COMPLETED | OUTPATIENT
Start: 2025-03-31 | End: 2025-03-31

## 2025-03-31 RX ORDER — SODIUM CHLORIDE, SODIUM LACTATE, POTASSIUM CHLORIDE, CALCIUM CHLORIDE 600; 310; 30; 20 MG/100ML; MG/100ML; MG/100ML; MG/100ML
9 INJECTION, SOLUTION INTRAVENOUS CONTINUOUS
Status: DISCONTINUED | OUTPATIENT
Start: 2025-03-31 | End: 2025-03-31 | Stop reason: HOSPADM

## 2025-03-31 RX ORDER — IBUPROFEN 800 MG/1
800 TABLET, FILM COATED ORAL EVERY 8 HOURS PRN
Qty: 60 TABLET | Refills: 0 | Status: SHIPPED | OUTPATIENT
Start: 2025-03-31

## 2025-03-31 RX ORDER — FAMOTIDINE 10 MG/ML
20 INJECTION, SOLUTION INTRAVENOUS ONCE
Status: COMPLETED | OUTPATIENT
Start: 2025-03-31 | End: 2025-03-31

## 2025-03-31 RX ORDER — PROMETHAZINE HYDROCHLORIDE 25 MG/1
25 TABLET ORAL ONCE AS NEEDED
Status: DISCONTINUED | OUTPATIENT
Start: 2025-03-31 | End: 2025-03-31 | Stop reason: HOSPADM

## 2025-03-31 RX ORDER — FENTANYL CITRATE 50 UG/ML
INJECTION, SOLUTION INTRAMUSCULAR; INTRAVENOUS AS NEEDED
Status: DISCONTINUED | OUTPATIENT
Start: 2025-03-31 | End: 2025-03-31 | Stop reason: SURG

## 2025-03-31 RX ORDER — ACETAMINOPHEN 500 MG
1000 TABLET ORAL ONCE
Status: DISCONTINUED | OUTPATIENT
Start: 2025-03-31 | End: 2025-03-31 | Stop reason: HOSPADM

## 2025-03-31 RX ORDER — ATROPINE SULFATE 0.4 MG/ML
0.4 INJECTION, SOLUTION INTRAMUSCULAR; INTRAVENOUS; SUBCUTANEOUS ONCE AS NEEDED
Status: DISCONTINUED | OUTPATIENT
Start: 2025-03-31 | End: 2025-03-31 | Stop reason: HOSPADM

## 2025-03-31 RX ADMIN — FENTANYL CITRATE 50 MCG: 50 INJECTION, SOLUTION INTRAMUSCULAR; INTRAVENOUS at 09:23

## 2025-03-31 RX ADMIN — ESMOLOL HYDROCHLORIDE 10 MG: 10 INJECTION, SOLUTION INTRAVENOUS at 07:52

## 2025-03-31 RX ADMIN — FAMOTIDINE 20 MG: 10 INJECTION INTRAVENOUS at 07:02

## 2025-03-31 RX ADMIN — GABAPENTIN 600 MG: 300 CAPSULE ORAL at 07:04

## 2025-03-31 RX ADMIN — ROCURONIUM BROMIDE 10 MG: 10 INJECTION, SOLUTION INTRAVENOUS at 08:00

## 2025-03-31 RX ADMIN — HYDROMORPHONE HYDROCHLORIDE 0.5 MG: 1 INJECTION, SOLUTION INTRAMUSCULAR; INTRAVENOUS; SUBCUTANEOUS at 10:51

## 2025-03-31 RX ADMIN — GLYCOPYRROLATE 0.1 MCG: 0.2 INJECTION, SOLUTION INTRAMUSCULAR; INTRAVENOUS at 07:40

## 2025-03-31 RX ADMIN — SODIUM CHLORIDE 2000 MG: 900 INJECTION INTRAVENOUS at 07:17

## 2025-03-31 RX ADMIN — DEXAMETHASONE SODIUM PHOSPHATE 6 MG: 4 INJECTION, SOLUTION INTRAMUSCULAR; INTRAVENOUS at 07:31

## 2025-03-31 RX ADMIN — PHENAZOPYRIDINE 200 MG: 200 TABLET ORAL at 07:03

## 2025-03-31 RX ADMIN — FENTANYL CITRATE 50 MCG: 50 INJECTION, SOLUTION INTRAMUSCULAR; INTRAVENOUS at 07:21

## 2025-03-31 RX ADMIN — ESMOLOL HYDROCHLORIDE 20 MG: 10 INJECTION, SOLUTION INTRAVENOUS at 07:46

## 2025-03-31 RX ADMIN — SODIUM CHLORIDE, POTASSIUM CHLORIDE, SODIUM LACTATE AND CALCIUM CHLORIDE 9 ML/HR: 600; 310; 30; 20 INJECTION, SOLUTION INTRAVENOUS at 06:48

## 2025-03-31 RX ADMIN — ACETAMINOPHEN 1000 MG: 500 TABLET, FILM COATED ORAL at 07:04

## 2025-03-31 RX ADMIN — BUPIVACAINE 20 ML: 13.3 INJECTION, SUSPENSION, LIPOSOMAL INFILTRATION at 07:28

## 2025-03-31 RX ADMIN — PROPOFOL 150 MG: 10 INJECTION, EMULSION INTRAVENOUS at 07:22

## 2025-03-31 RX ADMIN — GLYCOPYRROLATE 0.1 MCG: 0.2 INJECTION, SOLUTION INTRAMUSCULAR; INTRAVENOUS at 07:44

## 2025-03-31 RX ADMIN — LIDOCAINE HYDROCHLORIDE 50 MG: 20 INJECTION, SOLUTION INFILTRATION; PERINEURAL at 07:22

## 2025-03-31 RX ADMIN — ROCURONIUM BROMIDE 70 MG: 10 INJECTION, SOLUTION INTRAVENOUS at 07:22

## 2025-03-31 RX ADMIN — HYDROMORPHONE HYDROCHLORIDE 0.5 MG: 1 INJECTION, SOLUTION INTRAMUSCULAR; INTRAVENOUS; SUBCUTANEOUS at 09:07

## 2025-03-31 RX ADMIN — SODIUM CHLORIDE 2000 MG: 900 INJECTION INTRAVENOUS at 07:16

## 2025-03-31 RX ADMIN — KETOROLAC TROMETHAMINE 30 MG: 30 INJECTION, SOLUTION INTRAMUSCULAR at 08:52

## 2025-03-31 RX ADMIN — ONDANSETRON 4 MG: 2 INJECTION, SOLUTION INTRAMUSCULAR; INTRAVENOUS at 08:40

## 2025-03-31 RX ADMIN — SUGAMMADEX 200 MG: 100 INJECTION, SOLUTION INTRAVENOUS at 08:55

## 2025-03-31 RX ADMIN — HYDROMORPHONE HYDROCHLORIDE 0.5 MG: 1 INJECTION, SOLUTION INTRAMUSCULAR; INTRAVENOUS; SUBCUTANEOUS at 09:46

## 2025-03-31 RX ADMIN — BUPIVACAINE HYDROCHLORIDE AND EPINEPHRINE BITARTRATE 30 ML: 5; .005 INJECTION, SOLUTION EPIDURAL; INTRACAUDAL; PERINEURAL at 07:28

## 2025-03-31 NOTE — ANESTHESIA PROCEDURE NOTES
Airway  Reason: elective    Date/Time: 3/31/2025 7:25 AM  Airway not difficult    General Information and Staff    Patient location during procedure: OR  Anesthesiologist: Michael Gipson MD  CRNA/CAA: Theresa Morales CRNA    Indications and Patient Condition  Indications for airway management: airway protection    Preoxygenated: yes    Mask difficulty assessment: 1 - vent by mask    Final Airway Details    Final airway type: endotracheal airway      Successful airway: ETT  Cuffed: yes   Successful intubation technique: direct laryngoscopy  Adjuncts used in placement: intubating stylet  Endotracheal tube insertion site: oral  Blade: Antonieta  Blade size: 3  ETT size (mm): 7.0  Cormack-Lehane Classification: grade I - full view of glottis  Placement verified by: chest auscultation and capnometry   Measured from: lips  ETT/EBT  to lips (cm): 22  Number of attempts at approach: 1  Assessment: lips, teeth, and gum same as pre-op and atraumatic intubation

## 2025-03-31 NOTE — OP NOTE
[facility]   Obstetrics and Gynecology   Operative Note    Date of Procedure: 3/31/2025    Patient:  Darlin Lantigua   MR#: [id number]    PREOPERATIVE DIAGNOSIS:   Abnormal uterine bleeding (AUB) [N93.9]  Uterine leiomyoma, unspecified location [D25.9]    Post-Op Diagnosis Codes:     * Abnormal uterine bleeding (AUB) [N93.9]     * Uterine leiomyoma, unspecified location [D25.9]      PROCEDURES PERFORMED:    1. DaVinci-assisted total robotic hysterectomy  2. Bilateral salpingectomy    SURGEON: Socorro Valderrama MD    ASSISTANT: Norma Jennings MD    ANESTHESIA: General.      ESTIMATED BLOOD LOSS: 20 mL.      URINE OUTPUT: 140cc clear urine    SPECIMENS:   Order Name Source Comment Collection Info Order Time   HEMOGLOBIN AND HEMATOCRIT, BLOOD    3/31/2025  8:53 AM     Release to patient   Routine Release        TYPE AND SCREEN Arm, Left  Collected By: Anneliese Guzmán RN 3/31/2025  6:06 AM     Release to patient   Routine Release        TISSUE PATHOLOGY EXAM Uterus, Cervix, Bilateral Fallopian Tubes   Collected By: Socorro Valderrama MD 3/31/2025  8:29 AM     Release to patient   Routine Release            FINDINGS: normal external female genitalia, vagina, and cervix. Fibroid uterus (weighing 262g), normal bilateral fallopian tubes and ovaries    COMPLICATIONS: None    INDICATIONS: See the written history and physical.      DESCRIPTION OF PROCEDURE: The patient was taken to the operating room and placed in supine position. General anesthesia was administered. PUJA block was performed per Anesthesia.  The patient was repositioned into dorsal lithotomy with legs in Dk stirrups and SCDs in place.  Patient's arms were wrapped in protective foam padding and tucked at side. Patient was prepped and draped in the usual sterile fashion. The surgical time-out was completed for the procedure. 2g cefazolin was confirmed to have been given for surgical prophylaxis.    Attention was turned vaginally. A vergara  catheter was placed in bladder.  A weight speculum and right angle retractor were inserted. The cervix was grasped anteriorly with a single-tooth tenaculum. Anchoring stitch was placed in the cervix anteriorly and posteriorly with 0-Vicryl.  Uterus was sounded to 7cm.  The SVETA device was then inserted into the uterus using the anchoring stitches threaded through the cup to seat the cervix within the SVETA cup.  The intrauterine and occluder balloons were inflated.  Gloves were exchanged and attention was turned abdominally.     After confirmation of orogastric tube placement, a small horizontal incision was made at Faustin's point with scalpel.  The Veress needle was placed through the incision.  The abdomen was insufflated with CO2 gas with normal pressures noted while insufflating.  When adequate pneumoperitoneum was achieved, an 8 mm port was inserted through the incision and the scope immediately thereafter.  Intra-abdominal survey revealed atraumatic entry and findings noted above.  The patient was placed in steep Trendelenburg.  Three additional 8mm ports were placed under direct visualization, one supraumbilical and two to the right of the midline at the same level.  A final 12mm assistant port was placed in the left lower quadrant.  Syncapseinci robot was docked.    Bowel was manipulated cephalad to expose the pelvis.  Attention was turned to the right fallopian tube.  The length of the tubal mesosalpinx was clamped, cauterized, and transected with the vessel sealer.  The utero-ovarian ligament was clamped, cauterized, and transected in serial bites to the level of the round ligament. The round ligament was clamped, cauterized, and transected in serial bites down to a level just above the uterine vessels. The anterior leaflet of the peritoneum was then opened and incised with the monopolar hook to create a bladder flap. Attention was turned to the contralateral side and in an identical fashion, tubal mesosalpinx,  utero-ovarian ligament, and round ligament were clamped, cauterized, and transected to the level just above the uterine vessels. The peritoneal incision was connected with the contralateral side, and the dissection of the bladder flap was completed to expose the ridge of the ring of the SVETA device. The uterine vessels on the right were then clamped, cauterized, transected, and lateralized using vessel sealer. Identical procedure is repeated on the contralateral side.     Anterior colpotomy was performed. The colpotomy was carried out circumferentially with the monopolar hook on the upper edge of the SVETA ring. When the transection was complete, the specimen was delivered through the vagina without difficulty. No morcellation was needed. The vagina was occluded with an asepto-bulb to maintain pneumoperitoneum.  The operative site was copiously irrigated, inspected, and noted to be hemostatic.  The cuff was then closed with stratafix suture.  Careful attention was made during the closure to avoid the bladder flap anteriorly and the colon posteriorly. Jessica was applied to surgical bed.  Entire dissection bed was examined and excellent hemostasis was confirmed.  Bilateral ureters were examined and noted to be actively peristalsing.    All instruments were removed and robot undocked.  The abdomen was deflated.  Valsalva was given by anesthesia to displace all remaining air.  All ports were removed.  Skin incisions were closed with 4-0 Monocryl in a subcuticular fashion.      Attention was turned vaginally.  Asepto-bulb and vergara catheter were removed.  Excellent hemostasis was confirmed.    The patient was awakened and taken to the recovery room in stable condition.  Patient tolerated the procedure well. Counts were correct at conclusion of case.    Surgical Assistant was responsible for performing the following activities: Retraction, Suction, Irrigation, Suturing, Closing, Placing Dressing and Held/Positioned Camera  and their skilled assistance was necessary for the success of this case.    Socorro Valderrama MD  3/31/2025  08:55 EDT

## 2025-03-31 NOTE — ANESTHESIA PROCEDURE NOTES
Peripheral Block    Pre-sedation assessment completed: 3/31/2025 7:23 AM    Patient reassessed immediately prior to procedure    Patient location during procedure: OR  Start time: 3/31/2025 7:24 AM  Stop time: 3/31/2025 7:28 AM  Reason for block: at surgeon's request and post-op pain management  Performed by  Anesthesiologist: Michael Gipson MD  Preanesthetic Checklist  Completed: patient identified, IV checked, site marked, risks and benefits discussed, surgical consent, monitors and equipment checked, pre-op evaluation and timeout performed  Prep:  Sterile barriers:gloves  Prep: ChloraPrep  Patient monitoring: blood pressure monitoring, continuous pulse oximetry and EKG  Procedure    Sedation: yes  Performed under: epidural  Guidance:ultrasound guided    ULTRASOUND INTERPRETATION.  Using ultrasound guidance a 22 G gauge needle was placed in close proximity to the femoral nerve, at which point, under ultrasound guidance anesthetic was injected in the area of the nerve and spread of the anesthesia was seen on ultrasound in close proximity thereto.  There were no abnormalities seen on ultrasound; a digital image was taken; and the patient tolerated the procedure with no complications. Images:still images obtained    Laterality:Bilateral  Block Type:TAP  Injection Technique:single-shot  Needle Type:short-bevel  Needle Gauge:22 G      Medications Used: bupivacaine liposome (EXPAREL) 1.3 % injection - Infiltration   20 mL - 3/31/2025 7:28:00 AM  bupivacaine-EPINEPHrine PF (MARCAINE w/EPI) 0.5% -1:[id number] injection - Injection   30 mL - 3/31/2025 7:28:00 AM      Medications  Comment:Ultrasound Interpretation:  Using ultrasound guidance the needle was placed in close proximity to the nerve and anesthetic was injected in the area of the nerve and spread of the anesthetic was seen on ultrasound in close proximity thereto.  There were no abnormalities seen on ultrasound; a digital image was taken; and the patient  tolerated the procedure with no complications.    .    Post Assessment  Injection Assessment: negative aspiration for heme, no paresthesia on injection and incremental injection  Patient Tolerance:comfortable throughout block  Complications:no  Performed by: Michael Gipson MD

## 2025-03-31 NOTE — ANESTHESIA POSTPROCEDURE EVALUATION
Patient: Darlin Lantigua    Procedure Summary       Date: 03/31/25 Room / Location: [facility] OR [address] [place] [address] MAIN OR    Anesthesia Start: 0715 Anesthesia Stop: 0908    Procedure: DAVINCI-ASSISTED TOTAL ROBOTIC HYSTERECTOMY BILATERAL SALPINGECTOMY, POSSIBLE CYSTOSCOPY (Abdomen) Diagnosis:       Abnormal uterine bleeding (AUB)      Uterine leiomyoma, unspecified location      (Abnormal uterine bleeding (AUB) [N93.9])      (Uterine leiomyoma, unspecified location [D25.9])    Surgeons: Socorro Valderrama MD Provider: Michael Gipson MD    Anesthesia Type: general ASA Status: 1            Anesthesia Type: general    Vitals  Vitals Value Taken Time   /71 03/31/25 10:30   Temp 36.6 °C (97.8 °F) 03/31/25 09:06   Pulse 87 03/31/25 10:32   Resp     SpO2 92 % 03/31/25 10:32   Vitals shown include unfiled device data.        Post Anesthesia Care and Evaluation    Patient location during evaluation: bedside  Patient participation: complete - patient participated  Level of consciousness: awake and alert  Pain management: adequate    Airway patency: patent  Anesthetic complications: No anesthetic complications    Cardiovascular status: acceptable  Respiratory status: acceptable  Hydration status: acceptable    Comments: /71   Pulse 59   Temp 36.6 °C (97.8 °F)   Resp 18   SpO2 96%

## 2025-03-31 NOTE — ANESTHESIA PREPROCEDURE EVALUATION
" Anesthesia Evaluation     history of anesthetic complications:  prolonged sedation  NPO Solid Status: > 8 hours             Airway   Mallampati: II  TM distance: >3 FB  Neck ROM: full  No difficulty expected  Dental - normal exam     Pulmonary - negative pulmonary ROS   (-) wheezes  Cardiovascular - negative cardio ROS    Rhythm: regular        Neuro/Psych- negative ROS  GI/Hepatic/Renal/Endo      Musculoskeletal     Abdominal    Substance History      OB/GYN          Other                    Anesthesia Plan    ASA 1     general     (Discussed with patient risk and benefits of SUJEY including but not limited to : Bleeding, infection, PDPH, inadvertant spinal anesthetic, worsening pain, hyperglycemia, hypertension, CHF, nerve damage, steroid \"toxicity\" and AVN of hips.  Pt agrees to proceed.      She says  she is a \"lightweight\" and will have prolonged wake up.  )  intravenous induction     Anesthetic plan, risks, benefits, and alternatives have been provided, discussed and informed consent has been obtained with: patient.    CODE STATUS:         "

## 2025-03-31 NOTE — DISCHARGE INSTRUCTIONS
YOU WILL BE ON PELVIC REST FOR THE NEXT 2 WEEKS OR UNTIL SPECIFIED BY YOUR PHYSICIAN. PELVIC REST INCLUDES:  Avoiding long periods of standing.  Avoiding heavy lifting, pushing or pulling.  DO NOT lift anything heavier than 10 pounds (4.5 kg)  DO NOT douche, use tampons, or have sex (intercourse) for 2 weeks after the procedure.      Scopolamine Patch  This patch has been applied to the skin behind one of your ears.  It may stay in place up to 24 hours. You may remove it at any time after your surgery; however, it should be removed after you are up and walking around the next day.  This medicine reduces stomach upset. Side effects may include: dry mouth, dizziness, sleepiness, constipation, or upset stomach.  An allergy would show up as: a rash, itching, wheezing or shortness of breath.  Follow these instructions:  Do not drink alcohol, drive or operate machinery while taking this medicine.  Wear only 1 patch at a time. You can leave the patch on for up to 24 hours.  When you remove the patch, fold it in half with the sticky sides together and throw it away. Wash your hands and the area under the patch.  Do not touch your eye with your hand if it has touched the patch.  Wash your hands well before and after touching the patch.  Sit or stand slowly to avoid dizziness.  Call your doctor if you have:  Any sign of allergy  No relief  Trouble passing urine  Any new or severe symptoms

## 2025-03-31 NOTE — DISCHARGE SUMMARY
Discharge Summary    Date of Discharge:  3/31/2025    Discharge Diagnosis:   Abnormal uterine bleeding (AUB) [N93.9]  Uterine leiomyoma, unspecified location [D25.9]  Fibroids [D21.9]    Problem List:  Active Hospital Problems    Diagnosis  POA    **Fibroids [D21.9]  Yes    Fibroid uterus [D25.9]  Unknown    Abnormal uterine bleeding (AUB) [N93.9]  Unknown      Resolved Hospital Problems   No resolved problems to display.       Presenting Problem/History of Present Illness  Abnormal uterine bleeding (AUB) [N93.9]  Uterine leiomyoma, unspecified location [D25.9]  Fibroids [D21.9]    Hospital Course  Patient is a 50 y.o. female presented with Abnormal uterine bleeding (AUB) [N93.9]  Uterine leiomyoma, unspecified location [D25.9]  Fibroids [D21.9].  Patient underwent procedure listed below without complication.  Patient was discharged from PACU on same day as surgery.    Procedures Performed    Procedure(s):  DAVINCI-ASSISTED TOTAL ROBOTIC HYSTERECTOMY BILATERAL SALPINGECTOMY, POSSIBLE CYSTOSCOPY  -------------------       Condition on Discharge:  stable    Vital Signs  Temp:  [97.8 °F (36.6 °C)-98.9 °F (37.2 °C)] 97.8 °F (36.6 °C)  Heart Rate:  [79-96] 79  Resp:  [18] 18  BP: (116-122)/(64-76) 116/72    Discharge Disposition  Home or Self Care    Discharge Medications     Discharge Medications        New Medications        Instructions Start Date   acetaminophen 500 MG tablet  Commonly known as: TYLENOL   1,000 mg, Oral, Every 6 Hours PRN      ferrous sulfate 325 (65 FE) MG tablet   325 mg, Oral, Every Other Day      ibuprofen 800 MG tablet  Commonly known as: ADVIL,MOTRIN   800 mg, Oral, Every 8 Hours PRN      oxyCODONE 5 MG immediate release tablet  Commonly known as: Roxicodone   5 mg, Oral, Every 6 Hours PRN      sennosides-docusate 8.6-50 MG per tablet  Commonly known as: PERICOLACE   1 tablet, Oral, Daily             Continue These Medications        Instructions Start Date   MULTIVITAMINS PO   1 tablet, Oral,  Daily, HOLD FOR SURGERY      naproxen sodium 220 MG tablet  Commonly known as: ALEVE   220 mg, 2 Times Daily PRN             Stop These Medications      medroxyPROGESTERone 10 MG tablet  Commonly known as: Provera     medroxyPROGESTERone 150 MG/ML injection  Commonly known as: Depo-Provera              Discharge Diet   Regular    Activity at Discharge  Advance as tolerated  Pelvic rest    Follow-up Appointments  Future Appointments   Date Time Provider Department Center   4/11/2025  1:30 PM Socorro Valderrama MD [facility]         Test Results Pending at Discharge  Pending Labs       Order Current Status    Tissue Pathology Exam Collected (03/31/25 0807)            Socorro Valderrama MD  03/31/25 09:29 EDT

## 2025-04-01 LAB
CYTO UR: NORMAL
LAB AP CASE REPORT: NORMAL
PATH REPORT.FINAL DX SPEC: NORMAL
PATH REPORT.GROSS SPEC: NORMAL

## 2025-04-11 ENCOUNTER — OFFICE VISIT (OUTPATIENT)
Dept: OBSTETRICS AND GYNECOLOGY | Age: 50
End: 2025-04-11
Payer: MEDICAID

## 2025-04-11 VITALS
DIASTOLIC BLOOD PRESSURE: 74 MMHG | HEIGHT: 64 IN | BODY MASS INDEX: 26.67 KG/M2 | WEIGHT: 156.2 LBS | SYSTOLIC BLOOD PRESSURE: 114 MMHG

## 2025-04-11 DIAGNOSIS — Z09 POSTOPERATIVE EXAMINATION: Primary | ICD-10-CM

## 2025-04-11 PROCEDURE — 99024 POSTOP FOLLOW-UP VISIT: CPT | Performed by: STUDENT IN AN ORGANIZED HEALTH CARE EDUCATION/TRAINING PROGRAM

## 2025-04-11 NOTE — LETTER
April 11, 2025     Patient: Darlin Lantigua   YOB: 1975   Date of Visit: 4/11/2025       To Whom It May Concern:    Darlin Lantigua had surgery on 3/31/2025. The following week Ms. Lantigua was limited to light duty of lifting no more than 10lbs. Ms. Lantigua may return back to work 4/14/2025 with no restrictions.           Sincerely,        Socorro Valderrama MD

## 2025-04-11 NOTE — PROGRESS NOTES
[facility]   Obstetrics and Gynecology   Return Gynecology Visit    2025    Patient: Darlin Lantigua          MR#:[id number]    History of Present Illness    Chief Complaint   Patient presents with    Post-op Follow-up     Post-op follow-up for TRH, pt has no complaints today     50 y.o. female  who presents for approximately 2-week follow-up for robotic hysterectomy, bilateral salpingectomy.    Patient reports that she has been doing well since procedure.  She did not have the best experience in the PACU and discharge.  Felt like staff did not take seriously that she is significantly affected by anesthetic medications.  When she got home, however, it took her about 2 days for the effects of anesthesia to wear off.  She did not take any pain medication and reports that her pain gradually improved.  She had 1 moderate-sized blood clot that passed vaginally following surgery but otherwise has not had any vaginal bleeding.  Is having regular bowel movements.  Would like her blood counts checked at her next appointment to ensure her anemia is improving.  Adjacent to skin incisions did have mild allergic reaction to latex present in the Band-Aids.    Obstetric History:  OB History          1    Para   1    Term   1            AB        Living   1         SAB        IAB        Ectopic        Molar        Multiple        Live Births   1               Menstrual History:     No LMP recorded (lmp unknown). Patient has had a hysterectomy.     ________________________________________  Patient Active Problem List   Diagnosis    Abnormal uterine bleeding (AUB)    Special screening    Anemia due to acute blood loss    Fibroid uterus    Fibroids     Past Medical History:   Diagnosis Date    Abnormal uterine bleeding (AUB)     Back pain     Jaden-Danlos syndrome     Heavy menses     Uterine fibroid      Past Surgical History:   Procedure Laterality Date    HEMORRHOIDECTOMY      HERNIA  REPAIR      TOTAL LAPAROSCOPIC HYSTERECTOMY SALPINGECTOMY N/A 3/31/2025    Procedure: DAVINCI-ASSISTED TOTAL ROBOTIC HYSTERECTOMY BILATERAL SALPINGECTOMY, POSSIBLE CYSTOSCOPY;  Surgeon: Socorro Valderrama MD;  Location: [facility] OR;  Service: Robotics - DaVinci;  Laterality: N/A;     Social History     Tobacco Use    Smoking status: Never   Vaping Use    Vaping status: Never Used   Substance Use Topics    Alcohol use: Yes    Drug use: Never     Family History   Problem Relation Age of Onset    No Known Problems Mother     No Known Problems Father     Stroke Paternal Grandfather     Breast cancer Neg Hx     Ovarian cancer Neg Hx     Uterine cancer Neg Hx     Colon cancer Neg Hx     Prostate cancer Neg Hx     Pancreatic cancer Neg Hx     Malig Hyperthermia Neg Hx      Prior to Admission medications    Medication Sig Start Date End Date Taking? Authorizing Provider   ferrous sulfate 325 (65 FE) MG tablet Take 1 tablet by mouth Every Other Day. 3/31/25  Yes Socorro Valderrama MD   acetaminophen (TYLENOL) 500 MG tablet Take 2 tablets by mouth Every 6 (Six) Hours As Needed for Mild Pain.  Patient not taking: Reported on 4/11/2025 3/31/25   Socorro Valderrama MD   ibuprofen (ADVIL,MOTRIN) 800 MG tablet Take 1 tablet by mouth Every 8 (Eight) Hours As Needed for Mild Pain.  Patient not taking: Reported on 4/11/2025 3/31/25   Socorro Valderrama MD   Multiple Vitamin (MULTIVITAMINS PO) Take 1 tablet by mouth Daily. HOLD FOR SURGERY  Patient not taking: Reported on 4/11/2025    Pedro Davidson MD   naproxen sodium (ALEVE) 220 MG tablet Take 1 tablet by mouth 2 (Two) Times a Day As Needed for Mild Pain.  Patient not taking: Reported on 4/11/2025    Pedro Davidson MD   oxyCODONE (Roxicodone) 5 MG immediate release tablet Take 1 tablet by mouth Every 6 (Six) Hours As Needed for Moderate Pain.  Patient not taking: Reported on 4/11/2025 3/31/25   Socorro Valderrama MD   sennosides-docusate (senna-docusate sodium) 8.6-50 MG per tablet  "Take 1 tablet by mouth Daily.  Patient not taking: Reported on 2025 3/31/25   Socorro Valderrama MD     ________________________________________    Review of Systems   Gastrointestinal:  Negative for abdominal pain, constipation, nausea and vomiting.   Genitourinary:  Negative for vaginal bleeding.          Objective     /74   Ht 162.6 cm (64.02\")   Wt 70.9 kg (156 lb 3.2 oz)   LMP  (LMP Unknown) Comment: CONSTANT BLEEDING UNTIL STARTED PROGESTERONE WHICH IS PREVENTING BLEEDING AT THIS TIME  BMI 26.80 kg/m²    BP Readings from Last 3 Encounters:   25 114/74   25 121/77   25 107/65      Wt Readings from Last 3 Encounters:   25 70.9 kg (156 lb 3.2 oz)   25 71.4 kg (157 lb 4.8 oz)   24 67.5 kg (148 lb 12.8 oz)        BMI: Estimated body mass index is 26.8 kg/m² as calculated from the following:    Height as of this encounter: 162.6 cm (64.02\").    Weight as of this encounter: 70.9 kg (156 lb 3.2 oz).    Physical Exam  Vitals and nursing note reviewed.   Constitutional:       General: She is not in acute distress.     Appearance: Normal appearance.   Cardiovascular:      Pulses: Normal pulses.   Pulmonary:      Effort: Pulmonary effort is normal.   Abdominal:      General: There is no distension.      Palpations: Abdomen is soft.      Tenderness: There is no abdominal tenderness. There is no guarding or rebound.      Comments: Incisions c/d/I x5.  Mild erythema and evidence of healing adjacent to incisions present.   Musculoskeletal:         General: No swelling or tenderness.      Right lower leg: No edema.      Left lower leg: No edema.   Neurological:      Mental Status: She is alert and oriented to person, place, and time.   Psychiatric:         Mood and Affect: Mood normal.         Behavior: Behavior normal.       Assessment:  Darlin Lantigua is a 50 y.o.  presenting for 2-week follow-up status post robotic hysterectomy, bilateral salpingectomy.     "   Postoperative examination  Healing appropriately from hysterectomy.  Incisions without evidence of infection.  Will plan for cuff check at next appointment.  Pathology reviewed.           Plan:  Return in about 4 weeks (around 5/9/2025) for f/u 6wk TRH.  Will also complete annual exam at next visit.    Socorro Valderrama MD  4/11/2025 13:51 EDT

## 2025-04-11 NOTE — LETTER
April 11, 2025     Patient: Darlin Lantigua   YOB: 1975   Date of Visit: 4/11/2025       To Whom It May Concern:    It is my medical opinion that Darlin Lantigua may return to work on 04/14/2025 . Ms. Lantigua is to remain on light duty lifting no more than 10lbs for the first week, than may return back to normal working duties.           Sincerely,        Socorro Valderrama MD

## 2025-04-11 NOTE — LETTER
April 11, 2025     Patient: Darlin Lantigua   YOB: 1975   Date of Visit: 4/11/2025       To Whom It May Concern:    Darlin Lantigua had surgery on 3/31/2025. Ms. Lantigua is to remain on light duty for 1 week post operative not lifting no more than 10 pounds. Starting 4       Sincerely,        Socorro Valderrama MD    CC: No Recipients

## (undated) DEVICE — THE STERILE LIGHT HANDLE COVER IS USED WITH STERIS SURGICAL LIGHTING AND VISUALIZATION SYSTEMS.

## (undated) DEVICE — DRAPE,SPLIT,CV,CLR ANES,STERILE: Brand: MEDLINE

## (undated) DEVICE — SUT VIC 0 CT1 27IN DYED J340H

## (undated) DEVICE — LOU LITHOTOMY ROBOTIC: Brand: MEDLINE INDUSTRIES, INC.

## (undated) DEVICE — ANTIBACTERIAL UNDYED BRAIDED (POLYGLACTIN 910), SYNTHETIC ABSORBABLE SUTURE: Brand: COATED VICRYL

## (undated) DEVICE — SYRINGE, LUER LOCK, 60ML: Brand: MEDLINE

## (undated) DEVICE — GLV SURG SENSICARE POLYISPRN W/ALOE PF LF 6.5 GRN STRL

## (undated) DEVICE — IRRIGATOR BULB ASEPTO 60CC STRL

## (undated) DEVICE — APPL HEMO SURG ARISTA/AH/FLEXITIP XL 38CM

## (undated) DEVICE — SEAL

## (undated) DEVICE — PATIENT RETURN ELECTRODE, SINGLE-USE, CONTACT QUALITY MONITORING, ADULT, WITH 9FT CORD, FOR PATIENTS WEIGING OVER 33LBS. (15KG): Brand: MEGADYNE

## (undated) DEVICE — SYR LL TP 10ML STRL

## (undated) DEVICE — DRAPE,UNDERBUTTOCKS,PCH,STERILE: Brand: MEDLINE

## (undated) DEVICE — SUT MNCRYL PLS ANTIB UD 4/0 PS2 18IN

## (undated) DEVICE — COLUMN DRAPE

## (undated) DEVICE — GLV SURG SENSICARE PI MIC PF SZ6.5 LF STRL

## (undated) DEVICE — ARM DRAPE

## (undated) DEVICE — SOL ANTISTICK CAUTRY ELECTROLUBE LF

## (undated) DEVICE — ENDOPATH PNEUMONEEDLE INSUFFLATION NEEDLES WITH LUER LOCK CONNECTORS 120MM: Brand: ENDOPATH

## (undated) DEVICE — KIT,ANTI FOG,W/SPONGE & FLUID,SOFT PACK: Brand: MEDLINE

## (undated) DEVICE — ST TBG AIRSEAL FLTR TRI LUM

## (undated) DEVICE — TIP COVER ACCESSORY

## (undated) DEVICE — SYR LUERLOK 5CC

## (undated) DEVICE — DRSNG WND BORDR/ADHS NONADHR/GZ LF 2X2IN STRL

## (undated) DEVICE — TROC BLADLES ANCHORPORT/OPTI LP 12X120MM 1P/U

## (undated) DEVICE — SOL NACL 0.9PCT 1000ML

## (undated) DEVICE — BLADELESS OBTURATOR: Brand: WECK VISTA

## (undated) DEVICE — VESSEL SEALER EXTEND: Brand: ENDOWRIST